# Patient Record
Sex: FEMALE | Race: WHITE | NOT HISPANIC OR LATINO | Employment: FULL TIME | URBAN - METROPOLITAN AREA
[De-identification: names, ages, dates, MRNs, and addresses within clinical notes are randomized per-mention and may not be internally consistent; named-entity substitution may affect disease eponyms.]

---

## 2018-12-17 ENCOUNTER — TELEPHONE (OUTPATIENT)
Dept: OBSTETRICS AND GYNECOLOGY | Facility: CLINIC | Age: 38
End: 2018-12-17

## 2018-12-17 DIAGNOSIS — Z12.39 SCREENING FOR BREAST CANCER: Primary | ICD-10-CM

## 2018-12-17 NOTE — TELEPHONE ENCOUNTER
She has made an annual appt she wants to have her mammogram before she is seen due to her mother having cancer, can you please place the order.

## 2019-01-09 ENCOUNTER — HOSPITAL ENCOUNTER (OUTPATIENT)
Dept: MAMMOGRAPHY | Facility: HOSPITAL | Age: 39
Discharge: HOME | End: 2019-01-09
Attending: OBSTETRICS & GYNECOLOGY | Admitting: OBSTETRICS & GYNECOLOGY

## 2019-01-09 DIAGNOSIS — Z12.39 SCREENING FOR BREAST CANCER: ICD-10-CM

## 2019-01-09 PROCEDURE — 77067 SCR MAMMO BI INCL CAD: CPT

## 2019-01-09 PROCEDURE — 77063 BREAST TOMOSYNTHESIS BI: CPT

## 2019-02-27 ENCOUNTER — HOSPITAL ENCOUNTER (OUTPATIENT)
Dept: GENERAL RADIOLOGY | Facility: HOSPITAL | Age: 39
Discharge: HOME | End: 2019-02-27
Admitting: NURSE PRACTITIONER

## 2019-02-27 ENCOUNTER — TRANSCRIBE ORDERS (OUTPATIENT)
Dept: ADMINISTRATIVE | Facility: HOSPITAL | Age: 39
End: 2019-02-27

## 2019-02-27 DIAGNOSIS — Z87.09 PERSONAL HISTORY OF DISEASE OF RESPIRATORY SYSTEM: Primary | ICD-10-CM

## 2019-02-27 DIAGNOSIS — Z87.09 PERSONAL HISTORY OF DISEASE OF RESPIRATORY SYSTEM: ICD-10-CM

## 2019-02-27 PROCEDURE — 71046 X-RAY EXAM CHEST 2 VIEWS: CPT

## 2019-03-04 ENCOUNTER — TRANSCRIBE ORDERS (OUTPATIENT)
Dept: ADMINISTRATIVE | Facility: HOSPITAL | Age: 39
End: 2019-03-04

## 2019-03-04 DIAGNOSIS — E04.1 THYROID NODULE: Primary | ICD-10-CM

## 2019-03-14 ENCOUNTER — APPOINTMENT (OUTPATIENT)
Dept: ULTRASOUND IMAGING | Facility: HOSPITAL | Age: 39
End: 2019-03-14

## 2019-03-19 ENCOUNTER — HOSPITAL ENCOUNTER (OUTPATIENT)
Dept: ULTRASOUND IMAGING | Facility: HOSPITAL | Age: 39
Discharge: HOME | End: 2019-03-19
Admitting: NURSE PRACTITIONER

## 2019-03-19 DIAGNOSIS — E04.1 THYROID NODULE: ICD-10-CM

## 2019-03-19 PROCEDURE — 76536 US EXAM OF HEAD AND NECK: CPT

## 2019-10-02 ENCOUNTER — OFFICE VISIT (OUTPATIENT)
Dept: GASTROENTEROLOGY | Facility: CLINIC | Age: 39
End: 2019-10-02

## 2019-10-02 VITALS
WEIGHT: 178.8 LBS | DIASTOLIC BLOOD PRESSURE: 76 MMHG | SYSTOLIC BLOOD PRESSURE: 122 MMHG | HEIGHT: 62 IN | BODY MASS INDEX: 32.9 KG/M2

## 2019-10-02 DIAGNOSIS — K21.9 GASTROESOPHAGEAL REFLUX DISEASE, ESOPHAGITIS PRESENCE NOT SPECIFIED: Primary | ICD-10-CM

## 2019-10-02 DIAGNOSIS — R10.13 EPIGASTRIC PAIN: ICD-10-CM

## 2019-10-02 PROCEDURE — 99203 OFFICE O/P NEW LOW 30 MIN: CPT | Performed by: INTERNAL MEDICINE

## 2019-10-02 RX ORDER — LISINOPRIL 20 MG/1
20 TABLET ORAL DAILY
Refills: 0 | COMMUNITY
Start: 2019-09-18

## 2019-10-02 RX ORDER — LEVOTHYROXINE SODIUM 175 UG/1
TABLET ORAL
Refills: 0 | COMMUNITY
Start: 2019-09-18 | End: 2020-09-07

## 2019-10-02 RX ORDER — OMEGA-3S/DHA/EPA/FISH OIL/D3 300MG-1000
1 CAPSULE ORAL DAILY
Refills: 0 | COMMUNITY
Start: 2019-09-18 | End: 2020-09-07

## 2019-10-02 RX ORDER — ERGOCALCIFEROL 1.25 MG/1
50000 CAPSULE ORAL WEEKLY
Refills: 0 | COMMUNITY
Start: 2019-09-18

## 2019-10-02 RX ORDER — PANTOPRAZOLE SODIUM 40 MG/1
40 TABLET, DELAYED RELEASE ORAL DAILY
Qty: 30 TABLET | Refills: 5 | Status: SHIPPED | OUTPATIENT
Start: 2019-10-02 | End: 2020-03-05

## 2019-10-02 NOTE — PROGRESS NOTES
PATIENT INFORMATION  Erica Huynh       - 1980    CHIEF COMPLAINT  Chief Complaint   Patient presents with   • Abdominal Pain   • Heartburn       HISTORY OF PRESENT ILLNESS  HPI  40 yo with history GERD with 6 months of epigastric pain, intermittent, dull/sharp, usually occurs within minutes of eating, radiates at times to the back. Severity is moderate.  No melena or hematochezia. She has retrosternal burning/heartburn, worse in the morning and at night. She was on ppi many years ago but stopped after one month.  No previous EGD.  Occasional dysphagia.  Diet:  One cup coffee daily  No soda  No tea  No etoh  Dinner at 7 pm  Bed at midnight  No snack after dinner.  Weight has been up 25 pounds in 4 years.        REVIEW OF SYSTEMS  Review of Systems   Constitutional: Positive for fatigue.   HENT: Positive for ear pain and hearing loss.    Gastrointestinal: Positive for abdominal pain and nausea.        Reflux   Allergic/Immunologic: Positive for environmental allergies.   All other systems reviewed and are negative.        ACTIVE PROBLEMS  There are no active problems to display for this patient.        PAST MEDICAL HISTORY  Past Medical History:   Diagnosis Date   • Hyperlipidemia    • Hypertension          SURGICAL HISTORY  Past Surgical History:   Procedure Laterality Date   • HYSTERECTOMY           FAMILY HISTORY  Family History   Problem Relation Age of Onset   • Breast cancer Neg Hx    • Colon cancer Neg Hx    • Colon polyps Neg Hx          SOCIAL HISTORY  Social History     Occupational History   • Not on file   Tobacco Use   • Smoking status: Current Every Day Smoker   • Smokeless tobacco: Never Used   Substance and Sexual Activity   • Alcohol use: No     Frequency: Never   • Drug use: Not on file   • Sexual activity: Not on file       Debilities/Disabilities Identified: None    Emotional Behavior: Appropriate    CURRENT MEDICATIONS    Current Outpatient Medications:   •  IBUPROFEN PO, Take  by  "mouth., Disp: , Rfl:   •  levothyroxine (SYNTHROID, LEVOTHROID) 175 MCG tablet, TAKE ONE TABLET BY MOUTH EVERY MORNING ON EMPTY STOMACH, Disp: , Rfl: 0  •  lisinopril (PRINIVIL,ZESTRIL) 20 MG tablet, Take 20 mg by mouth Daily., Disp: , Rfl: 0  •  Omega-3 Fatty Acids (FISH OIL BURP-LESS) 1200 MG capsule, Take 1 capsule by mouth Daily., Disp: , Rfl: 0  •  pantoprazole (PROTONIX) 40 MG EC tablet, Take 1 tablet by mouth Daily., Disp: 30 tablet, Rfl: 5  •  sertraline (ZOLOFT) 50 MG tablet, Take 50 mg by mouth Daily., Disp: , Rfl: 0  •  vitamin D (ERGOCALCIFEROL) 95265 units capsule capsule, Take 50,000 Units by mouth 1 (One) Time Per Week., Disp: , Rfl: 0    ALLERGIES  Patient has no known allergies.    VITALS  Vitals:    10/02/19 1333   BP: 122/76   Weight: 81.1 kg (178 lb 12.8 oz)   Height: 157.5 cm (62\")       LAST RESULTS   Hospital Outpatient Visit on 06/02/2015   Component Date Value Ref Range Status   • WBC 05/26/2015 16.78* 4.80 - 10.80 K/Cumm Final   • RBC 05/26/2015 4.37  4.20 - 5.40 Million Final   • Hemoglobin 05/26/2015 12.1  12.0 - 16.0 g/dL Final   • Hematocrit 05/26/2015 36.6* 37.0 - 47.0 % Final   • MCV 05/26/2015 83.8  81.0 - 99.0 fL Final   • MCH 05/26/2015 27.7  27.0 - 31.0 pg Final   • MCHC 05/26/2015 33.1  31.0 - 37.0 g/dL Final   • RDW-CV 05/26/2015 16.3* 11.5 - 14.5 % Final   • Platelets 05/26/2015 272  140 - 500 K/Cumm Final   • MPV 05/26/2015 10.1  7.4 - 10.4 fL Final   • Neutrophil Rel % 05/26/2015 84* 45 - 70 % Final   • Lymphocyte Rel % 05/26/2015 9* 20 - 45 % Final   • Monocyte Rel % 05/26/2015 5  3 - 8 % Final   • Eosinophil Rel % 05/26/2015 1  0 - 4 % Final   • Basophil Rel % 05/26/2015 0  0 - 2 % Final   • Neutrophils Absolute 05/26/2015 14.18* 1.50 - 8.30 K/Cumm Final   • Lymphocytes Absolute 05/26/2015 1.48  0.60 - 4.80 K/Cumm Final   • Monocytes Absolute 05/26/2015 0.85  0.00 - 1.00 K/Cumm Final   • Eosinophils Absolute 05/26/2015 0.21  0.10 - 0.30 K/Cumm Final   • Basophils Absolute " 05/26/2015 0.06  0.00 - 0.20 K/Cumm Final   • ABORh 05/26/2015 O Rh Positive   Final   • Antibody Screen 05/26/2015 Negative   Final   • HCG Qualitative 06/02/2015 Negative   Final   • WBC 06/02/2015 9.17  4.80 - 10.80 K/Cumm Final   • RBC 06/02/2015 4.69  4.20 - 5.40 Million Final   • Hemoglobin 06/02/2015 12.9  12.0 - 16.0 g/dL Final   • Hematocrit 06/02/2015 39.0  37.0 - 47.0 % Final   • MCV 06/02/2015 83.2  81.0 - 99.0 fL Final   • MCH 06/02/2015 27.5  27.0 - 31.0 pg Final   • MCHC 06/02/2015 33.1  31.0 - 37.0 g/dL Final   • RDW-CV 06/02/2015 15.9* 11.5 - 14.5 % Final   • Platelets 06/02/2015 254  140 - 500 K/Cumm Final   • MPV 06/02/2015 9.5  7.4 - 10.4 fL Final   • Neutrophil Rel % 06/02/2015 69  45 - 70 % Final   • Lymphocyte Rel % 06/02/2015 17* 20 - 45 % Final   • Monocyte Rel % 06/02/2015 9* 3 - 8 % Final   • Eosinophil Rel % 06/02/2015 4  0 - 4 % Final   • Basophil Rel % 06/02/2015 1  0 - 2 % Final   • Neutrophils Absolute 06/02/2015 6.33  1.50 - 8.30 K/Cumm Final   • Lymphocytes Absolute 06/02/2015 1.58  0.60 - 4.80 K/Cumm Final   • Monocytes Absolute 06/02/2015 0.79  0.00 - 1.00 K/Cumm Final   • Eosinophils Absolute 06/02/2015 0.36* 0.10 - 0.30 K/Cumm Final   • Basophils Absolute 06/02/2015 0.11  0.00 - 0.20 K/Cumm Final   • WBC 06/03/2015 18.61* 4.80 - 10.80 K/Cumm Final   • RBC 06/03/2015 3.91* 4.20 - 5.40 Million Final   • Hemoglobin 06/03/2015 10.5* 12.0 - 16.0 g/dL Final   • Hematocrit 06/03/2015 33.6* 37.0 - 47.0 % Final   • MCV 06/03/2015 85.9  81.0 - 99.0 fL Final   • MCH 06/03/2015 26.9* 27.0 - 31.0 pg Final   • MCHC 06/03/2015 31.3  31.0 - 37.0 g/dL Final   • RDW-CV 06/03/2015 16.2* 11.5 - 14.5 % Final   • Platelets 06/03/2015 227  140 - 500 K/Cumm Final   • MPV 06/03/2015 10.1  7.4 - 10.4 fL Final   • Neutrophil Rel % 06/03/2015 85* 45 - 70 % Final   • Lymphocyte Rel % 06/03/2015 7* 20 - 45 % Final   • Monocyte Rel % 06/03/2015 8  3 - 8 % Final   • Eosinophil Rel % 06/03/2015 0  0 - 4 % Final    • Basophil Rel % 06/03/2015 0  0 - 2 % Final   • Neutrophils Absolute 06/03/2015 15.83* 1.50 - 8.30 K/Cumm Final   • Lymphocytes Absolute 06/03/2015 1.22  0.60 - 4.80 K/Cumm Final   • Monocytes Absolute 06/03/2015 1.53* 0.00 - 1.00 K/Cumm Final   • Eosinophils Absolute 06/03/2015 0.01* 0.10 - 0.30 K/Cumm Final   • Basophils Absolute 06/03/2015 0.02  0.00 - 0.20 K/Cumm Final   • Pathology Report Tracking 06/02/2015 See Original Surgical Pathology Report from [facility]^[address]^[place] [address]   Final     No results found.    PHYSICAL EXAM  Physical Exam   Constitutional: She is oriented to person, place, and time. She appears well-developed and well-nourished. No distress.   HENT:   Head: Normocephalic and atraumatic.   Mouth/Throat: Oropharynx is clear and moist.   Eyes: EOM are normal. Pupils are equal, round, and reactive to light.   Neck: Normal range of motion. No tracheal deviation present.   Cardiovascular: Normal rate, regular rhythm, normal heart sounds and intact distal pulses. Exam reveals no gallop and no friction rub.   No murmur heard.  Pulmonary/Chest: Effort normal and breath sounds normal. No stridor. No respiratory distress. She has no wheezes. She has no rales. She exhibits no tenderness.   Abdominal: Soft. Bowel sounds are normal. She exhibits no distension. There is no tenderness. There is no rebound and no guarding.   Musculoskeletal: She exhibits no edema.   Lymphadenopathy:     She has no cervical adenopathy.   Neurological: She is alert and oriented to person, place, and time.   Skin: Skin is warm. She is not diaphoretic.   Psychiatric: She has a normal mood and affect. Her behavior is normal. Judgment and thought content normal.   Nursing note and vitals reviewed.      ASSESSMENT  Diagnoses and all orders for this visit:    Gastroesophageal reflux disease, esophagitis presence not specified  -     Hepatic  Function Panel  -     Lipase  -     Amylase    Epigastric pain  -     Hepatic Function Panel  -     Lipase  -     Amylase    Other orders  -     IBUPROFEN PO; Take  by mouth.  -     vitamin D (ERGOCALCIFEROL) 25935 units capsule capsule; Take 50,000 Units by mouth 1 (One) Time Per Week.  -     levothyroxine (SYNTHROID, LEVOTHROID) 175 MCG tablet; TAKE ONE TABLET BY MOUTH EVERY MORNING ON EMPTY STOMACH  -     lisinopril (PRINIVIL,ZESTRIL) 20 MG tablet; Take 20 mg by mouth Daily.  -     Omega-3 Fatty Acids (FISH OIL BURP-LESS) 1200 MG capsule; Take 1 capsule by mouth Daily.  -     sertraline (ZOLOFT) 50 MG tablet; Take 50 mg by mouth Daily.  -     pantoprazole (PROTONIX) 40 MG EC tablet; Take 1 tablet by mouth Daily.          PLAN  No Follow-up on file.        Antireflux measures and dietary modifications reviewed. Low acid diet reviewed. Keep head of bed elevated. Stop eating/drinking at least 3 hours prior to bedtime. Eliminate caffeine and carbonated beverages.  Weight loss encouraged if BMI over 25.    Indications, risks and procedure were discussed with the patient, including but not limited to, bleeding, infection, possibility of perforation and possible polypectomy. All of the patient's questions were answered, and signed informed consent was obtained and placed on the chart.

## 2019-10-03 PROBLEM — R10.13 EPIGASTRIC PAIN: Status: ACTIVE | Noted: 2019-10-03

## 2019-10-03 PROBLEM — K21.9 GASTROESOPHAGEAL REFLUX DISEASE: Status: ACTIVE | Noted: 2019-10-03

## 2019-10-10 ENCOUNTER — ANESTHESIA EVENT (OUTPATIENT)
Dept: PERIOP | Facility: HOSPITAL | Age: 39
End: 2019-10-10

## 2019-10-11 ENCOUNTER — HOSPITAL ENCOUNTER (OUTPATIENT)
Facility: HOSPITAL | Age: 39
Setting detail: HOSPITAL OUTPATIENT SURGERY
Discharge: HOME | End: 2019-10-11
Attending: INTERNAL MEDICINE | Admitting: INTERNAL MEDICINE

## 2019-10-11 ENCOUNTER — ANESTHESIA (OUTPATIENT)
Dept: PERIOP | Facility: HOSPITAL | Age: 39
End: 2019-10-11

## 2019-10-11 VITALS
TEMPERATURE: 97.2 F | BODY MASS INDEX: 33.16 KG/M2 | SYSTOLIC BLOOD PRESSURE: 109 MMHG | OXYGEN SATURATION: 99 % | DIASTOLIC BLOOD PRESSURE: 83 MMHG | HEIGHT: 62 IN | HEART RATE: 82 BPM | WEIGHT: 180.2 LBS | RESPIRATION RATE: 16 BRPM

## 2019-10-11 DIAGNOSIS — R10.13 EPIGASTRIC PAIN: ICD-10-CM

## 2019-10-11 DIAGNOSIS — K21.9 GASTROESOPHAGEAL REFLUX DISEASE, ESOPHAGITIS PRESENCE NOT SPECIFIED: ICD-10-CM

## 2019-10-11 PROCEDURE — 25010000002 PROPOFOL 10 MG/ML EMULSION: Performed by: NURSE ANESTHETIST, CERTIFIED REGISTERED

## 2019-10-11 PROCEDURE — 88313 SPECIAL STAINS GROUP 2: CPT | Performed by: INTERNAL MEDICINE

## 2019-10-11 PROCEDURE — 88305 TISSUE EXAM BY PATHOLOGIST: CPT | Performed by: INTERNAL MEDICINE

## 2019-10-11 PROCEDURE — 43239 EGD BIOPSY SINGLE/MULTIPLE: CPT | Performed by: INTERNAL MEDICINE

## 2019-10-11 RX ORDER — ONDANSETRON 2 MG/ML
4 INJECTION INTRAMUSCULAR; INTRAVENOUS ONCE AS NEEDED
Status: DISCONTINUED | OUTPATIENT
Start: 2019-10-11 | End: 2019-10-11 | Stop reason: HOSPADM

## 2019-10-11 RX ORDER — LIDOCAINE HYDROCHLORIDE 10 MG/ML
0.5 INJECTION, SOLUTION EPIDURAL; INFILTRATION; INTRACAUDAL; PERINEURAL ONCE AS NEEDED
Status: COMPLETED | OUTPATIENT
Start: 2019-10-11 | End: 2019-10-11

## 2019-10-11 RX ORDER — SODIUM CHLORIDE 0.9 % (FLUSH) 0.9 %
1-10 SYRINGE (ML) INJECTION AS NEEDED
Status: DISCONTINUED | OUTPATIENT
Start: 2019-10-11 | End: 2019-10-11 | Stop reason: HOSPADM

## 2019-10-11 RX ORDER — SODIUM CHLORIDE, SODIUM LACTATE, POTASSIUM CHLORIDE, CALCIUM CHLORIDE 600; 310; 30; 20 MG/100ML; MG/100ML; MG/100ML; MG/100ML
9 INJECTION, SOLUTION INTRAVENOUS CONTINUOUS
Status: DISCONTINUED | OUTPATIENT
Start: 2019-10-11 | End: 2019-10-11 | Stop reason: HOSPADM

## 2019-10-11 RX ORDER — LIDOCAINE HYDROCHLORIDE 20 MG/ML
INJECTION, SOLUTION INFILTRATION; PERINEURAL AS NEEDED
Status: DISCONTINUED | OUTPATIENT
Start: 2019-10-11 | End: 2019-10-11 | Stop reason: SURG

## 2019-10-11 RX ORDER — SODIUM CHLORIDE, SODIUM LACTATE, POTASSIUM CHLORIDE, CALCIUM CHLORIDE 600; 310; 30; 20 MG/100ML; MG/100ML; MG/100ML; MG/100ML
100 INJECTION, SOLUTION INTRAVENOUS CONTINUOUS
Status: DISCONTINUED | OUTPATIENT
Start: 2019-10-11 | End: 2019-10-11 | Stop reason: HOSPADM

## 2019-10-11 RX ORDER — SODIUM CHLORIDE 9 MG/ML
40 INJECTION, SOLUTION INTRAVENOUS AS NEEDED
Status: DISCONTINUED | OUTPATIENT
Start: 2019-10-11 | End: 2019-10-11 | Stop reason: HOSPADM

## 2019-10-11 RX ORDER — PROPOFOL 10 MG/ML
VIAL (ML) INTRAVENOUS AS NEEDED
Status: DISCONTINUED | OUTPATIENT
Start: 2019-10-11 | End: 2019-10-11 | Stop reason: SURG

## 2019-10-11 RX ORDER — SODIUM CHLORIDE 0.9 % (FLUSH) 0.9 %
3 SYRINGE (ML) INJECTION EVERY 12 HOURS SCHEDULED
Status: DISCONTINUED | OUTPATIENT
Start: 2019-10-11 | End: 2019-10-11 | Stop reason: HOSPADM

## 2019-10-11 RX ADMIN — PROPOFOL 100 MG: 10 INJECTION, EMULSION INTRAVENOUS at 09:09

## 2019-10-11 RX ADMIN — SODIUM CHLORIDE, POTASSIUM CHLORIDE, SODIUM LACTATE AND CALCIUM CHLORIDE 9 ML/HR: 600; 310; 30; 20 INJECTION, SOLUTION INTRAVENOUS at 08:40

## 2019-10-11 RX ADMIN — LIDOCAINE HYDROCHLORIDE 100 MG: 20 INJECTION, SOLUTION INFILTRATION; PERINEURAL at 09:08

## 2019-10-11 RX ADMIN — LIDOCAINE HYDROCHLORIDE 0.1 ML: 10 INJECTION, SOLUTION EPIDURAL; INFILTRATION; INTRACAUDAL; PERINEURAL at 08:39

## 2019-10-11 RX ADMIN — PROPOFOL 50 MG: 10 INJECTION, EMULSION INTRAVENOUS at 09:17

## 2019-10-11 RX ADMIN — PROPOFOL 50 MG: 10 INJECTION, EMULSION INTRAVENOUS at 09:13

## 2019-10-11 RX ADMIN — PROPOFOL 50 MG: 10 INJECTION, EMULSION INTRAVENOUS at 09:21

## 2019-10-11 NOTE — ANESTHESIA PREPROCEDURE EVALUATION
Anesthesia Evaluation     Patient summary reviewed and Nursing notes reviewed   no history of anesthetic complications:  NPO Solid Status: > 8 hours  NPO Liquid Status: > 8 hours           Airway   Mallampati: I  TM distance: >3 FB  Neck ROM: full  No difficulty expected  Dental - normal exam     Pulmonary - normal exam    breath sounds clear to auscultation  (+) a smoker Current Smoked day of surgery, sleep apnea (Snores),   Cardiovascular - normal exam  Exercise tolerance: good (4-7 METS)    Rhythm: regular  Rate: normal    (+) hypertension, hyperlipidemia,       Neuro/Psych  (+) psychiatric history Anxiety,     GI/Hepatic/Renal/Endo    (+) obesity,  GERD,  hypothyroidism,     Musculoskeletal (-) negative ROS    Abdominal  - normal exam   Substance History - negative use     OB/GYN negative ob/gyn ROS         Other - negative ROS                       Anesthesia Plan    ASA 2     MAC     intravenous induction   Anesthetic plan, all risks, benefits, and alternatives have been provided, discussed and informed consent has been obtained with: patient.  Use of blood products discussed with patient  Consented to blood products.

## 2019-10-11 NOTE — H&P
- 1980   CHIEF COMPLAINT       Chief Complaint   Patient presents with   • Abdominal Pain   • Heartburn     HISTORY OF PRESENT ILLNESS   HPI   40 yo with history GERD with 6 months of epigastric pain, intermittent, dull/sharp, usually occurs within minutes of eating, radiates at times to the back. Severity is moderate.   No melena or hematochezia. She has retrosternal burning/heartburn, worse in the morning and at night. She was on ppi many years ago but stopped after one month.   No previous EGD.   Occasional dysphagia.   Diet:   One cup coffee daily   No soda   No tea   No etoh   Dinner at 7 pm   Bed at midnight   No snack after dinner.   Weight has been up 25 pounds in 4 years.   REVIEW OF SYSTEMS   Review of Systems   Constitutional: Positive for fatigue.   HENT: Positive for ear pain and hearing loss.   Gastrointestinal: Positive for abdominal pain and nausea.   Reflux   Allergic/Immunologic: Positive for environmental allergies.   All other systems reviewed and are negative.     ACTIVE PROBLEMS   There are no active problems to display for this patient.     PAST MEDICAL HISTORY   Medical History        Past Medical History:   Diagnosis Date   • Hyperlipidemia    • Hypertension      SURGICAL HISTORY   Surgical History         Past Surgical History:   Procedure Laterality Date   • HYSTERECTOMY       FAMILY HISTORY         Family History   Problem Relation Age of Onset   • Breast cancer Neg Hx    • Colon cancer Neg Hx    • Colon polyps Neg Hx      SOCIAL HISTORY   Social History           Occupational History   • Not on file   Tobacco Use   • Smoking status: Current Every Day Smoker   • Smokeless tobacco: Never Used   Substance and Sexual Activity   • Alcohol use: No     Frequency: Never   • Drug use: Not on file   • Sexual activity: Not on file     Debilities/Disabilities Identified: None   Emotional Behavior: Appropriate   CURRENT MEDICATIONS     Current Outpatient Medications:   • IBUPROFEN PO, Take  "by mouth., Disp: , Rfl:   • levothyroxine (SYNTHROID, LEVOTHROID) 175 MCG tablet, TAKE ONE TABLET BY MOUTH EVERY MORNING ON EMPTY STOMACH, Disp: , Rfl: 0   • lisinopril (PRINIVIL,ZESTRIL) 20 MG tablet, Take 20 mg by mouth Daily., Disp: , Rfl: 0   • Omega-3 Fatty Acids (FISH OIL BURP-LESS) 1200 MG capsule, Take 1 capsule by mouth Daily., Disp: , Rfl: 0   • pantoprazole (PROTONIX) 40 MG EC tablet, Take 1 tablet by mouth Daily., Disp: 30 tablet, Rfl: 5   • sertraline (ZOLOFT) 50 MG tablet, Take 50 mg by mouth Daily., Disp: , Rfl: 0   • vitamin D (ERGOCALCIFEROL) 64860 units capsule capsule, Take 50,000 Units by mouth 1 (One) Time Per Week., Disp: , Rfl: 0   ALLERGIES   Patient has no known allergies.   VITALS   /80   Pulse 96   Temp 98.4 °F (36.9 °C) (Oral)   Resp 15   Ht 157.5 cm (62.01\")   Wt 81.7 kg (180 lb 3.2 oz)   SpO2 97%   BMI 32.95 kg/m²                                     LAST RESULTS           Hospital Outpatient Visit on 06/02/2015   Component Date Value Ref Range Status   • WBC 05/26/2015 16.78* 4.80 - 10.80 K/Cumm Final   • RBC 05/26/2015 4.37  4.20 - 5.40 Million Final   • Hemoglobin 05/26/2015 12.1  12.0 - 16.0 g/dL Final   • Hematocrit 05/26/2015 36.6* 37.0 - 47.0 % Final   • MCV 05/26/2015 83.8  81.0 - 99.0 fL Final   • MCH 05/26/2015 27.7  27.0 - 31.0 pg Final   • MCHC 05/26/2015 33.1  31.0 - 37.0 g/dL Final   • RDW-CV 05/26/2015 16.3* 11.5 - 14.5 % Final   • Platelets 05/26/2015 272  140 - 500 K/Cumm Final   • MPV 05/26/2015 10.1  7.4 - 10.4 fL Final   • Neutrophil Rel % 05/26/2015 84* 45 - 70 % Final   • Lymphocyte Rel % 05/26/2015 9* 20 - 45 % Final   • Monocyte Rel % 05/26/2015 5  3 - 8 % Final   • Eosinophil Rel % 05/26/2015 1  0 - 4 % Final   • Basophil Rel % 05/26/2015 0  0 - 2 % Final   • Neutrophils Absolute 05/26/2015 14.18* 1.50 - 8.30 K/Cumm Final   • Lymphocytes Absolute 05/26/2015 1.48  0.60 - 4.80 K/Cumm Final   • Monocytes Absolute 05/26/2015 0.85  0.00 - 1.00 K/Cumm " Final   • Eosinophils Absolute 05/26/2015 0.21  0.10 - 0.30 K/Cumm Final   • Basophils Absolute 05/26/2015 0.06  0.00 - 0.20 K/Cumm Final   • ABORh 05/26/2015 O Rh Positive   Final   • Antibody Screen 05/26/2015 Negative   Final   • HCG Qualitative 06/02/2015 Negative   Final   • WBC 06/02/2015 9.17  4.80 - 10.80 K/Cumm Final   • RBC 06/02/2015 4.69  4.20 - 5.40 Million Final   • Hemoglobin 06/02/2015 12.9  12.0 - 16.0 g/dL Final   • Hematocrit 06/02/2015 39.0  37.0 - 47.0 % Final   • MCV 06/02/2015 83.2  81.0 - 99.0 fL Final   • MCH 06/02/2015 27.5  27.0 - 31.0 pg Final   • MCHC 06/02/2015 33.1  31.0 - 37.0 g/dL Final   • RDW-CV 06/02/2015 15.9* 11.5 - 14.5 % Final   • Platelets 06/02/2015 254  140 - 500 K/Cumm Final   • MPV 06/02/2015 9.5  7.4 - 10.4 fL Final   • Neutrophil Rel % 06/02/2015 69  45 - 70 % Final   • Lymphocyte Rel % 06/02/2015 17* 20 - 45 % Final   • Monocyte Rel % 06/02/2015 9* 3 - 8 % Final   • Eosinophil Rel % 06/02/2015 4  0 - 4 % Final   • Basophil Rel % 06/02/2015 1  0 - 2 % Final   • Neutrophils Absolute 06/02/2015 6.33  1.50 - 8.30 K/Cumm Final   • Lymphocytes Absolute 06/02/2015 1.58  0.60 - 4.80 K/Cumm Final   • Monocytes Absolute 06/02/2015 0.79  0.00 - 1.00 K/Cumm Final   • Eosinophils Absolute 06/02/2015 0.36* 0.10 - 0.30 K/Cumm Final   • Basophils Absolute 06/02/2015 0.11  0.00 - 0.20 K/Cumm Final   • WBC 06/03/2015 18.61* 4.80 - 10.80 K/Cumm Final   • RBC 06/03/2015 3.91* 4.20 - 5.40 Million Final   • Hemoglobin 06/03/2015 10.5* 12.0 - 16.0 g/dL Final   • Hematocrit 06/03/2015 33.6* 37.0 - 47.0 % Final   • MCV 06/03/2015 85.9  81.0 - 99.0 fL Final   • MCH 06/03/2015 26.9* 27.0 - 31.0 pg Final   • MCHC 06/03/2015 31.3  31.0 - 37.0 g/dL Final   • RDW-CV 06/03/2015 16.2* 11.5 - 14.5 % Final   • Platelets 06/03/2015 227  140 - 500 K/Cumm Final   • MPV 06/03/2015 10.1  7.4 - 10.4 fL Final   • Neutrophil Rel % 06/03/2015 85* 45 - 70 % Final   • Lymphocyte Rel % 06/03/2015 7* 20 - 45 % Final    • Monocyte Rel % 06/03/2015 8  3 - 8 % Final   • Eosinophil Rel % 06/03/2015 0  0 - 4 % Final   • Basophil Rel % 06/03/2015 0  0 - 2 % Final   • Neutrophils Absolute 06/03/2015 15.83* 1.50 - 8.30 K/Cumm Final   • Lymphocytes Absolute 06/03/2015 1.22  0.60 - 4.80 K/Cumm Final   • Monocytes Absolute 06/03/2015 1.53* 0.00 - 1.00 K/Cumm Final   • Eosinophils Absolute 06/03/2015 0.01* 0.10 - 0.30 K/Cumm Final   • Basophils Absolute 06/03/2015 0.02  0.00 - 0.20 K/Cumm Final   • Pathology Report Tracking 06/02/2015 See Original Surgical Pathology Report from [facility]^[address]^[place] [address]   Final     No results found.   PHYSICAL EXAM   Physical Exam   Constitutional: She is oriented to person, place, and time. She appears well-developed and well-nourished. No distress.   HENT:   Head: Normocephalic and atraumatic.   Mouth/Throat: Oropharynx is clear and moist.   Eyes: EOM are normal. Pupils are equal, round, and reactive to light.   Neck: Normal range of motion. No tracheal deviation present.   Cardiovascular: Normal rate, regular rhythm, normal heart sounds and intact distal pulses. Exam reveals no gallop and no friction rub.   No murmur heard.   Pulmonary/Chest: Effort normal and breath sounds normal. No stridor. No respiratory distress. She has no wheezes. She has no rales. She exhibits no tenderness.   Abdominal: Soft. Bowel sounds are normal. She exhibits no distension. There is no tenderness. There is no rebound and no guarding.   Musculoskeletal: She exhibits no edema.   Lymphadenopathy:   She has no cervical adenopathy.   Neurological: She is alert and oriented to person, place, and time.   Skin: Skin is warm. She is not diaphoretic.   Psychiatric: She has a normal mood and affect. Her behavior is normal. Judgment and thought content normal.   Nursing note and vitals reviewed.     ASSESSMENT   Diagnoses and all orders for this  visit:   Gastroesophageal reflux disease, esophagitis presence not specified   - Hepatic Function Panel   - Lipase   - Amylase   Epigastric pain   - Hepatic Function Panel   - Lipase   - Amylase   Other orders   - IBUPROFEN PO; Take by mouth.   - vitamin D (ERGOCALCIFEROL) 21520 units capsule capsule; Take 50,000 Units by mouth 1 (One) Time Per Week.   - levothyroxine (SYNTHROID, LEVOTHROID) 175 MCG tablet; TAKE ONE TABLET BY MOUTH EVERY MORNING ON EMPTY STOMACH   - lisinopril (PRINIVIL,ZESTRIL) 20 MG tablet; Take 20 mg by mouth Daily.   - Omega-3 Fatty Acids (FISH OIL BURP-LESS) 1200 MG capsule; Take 1 capsule by mouth Daily.   - sertraline (ZOLOFT) 50 MG tablet; Take 50 mg by mouth Daily.   - pantoprazole (PROTONIX) 40 MG EC tablet; Take 1 tablet by mouth Daily.     PLAN   No Follow-up on file.   Antireflux measures and dietary modifications reviewed. Low acid diet reviewed. Keep head of bed elevated. Stop eating/drinking at least 3 hours prior to bedtime. Eliminate caffeine and carbonated beverages. Weight loss encouraged if BMI over 25.   Indications, risks and procedure were discussed with the patient, including but not limited to, bleeding, infection, possibility of perforation and possible polypectomy. All of the patient's questions were answered, and signed informed consent was obtained and placed on the chart.

## 2019-10-11 NOTE — OP NOTE
Patient Name:  Erica Huynh  YOB: 1980    Date of Procedure:  10/11/2019    Procedure Performed: EGD  Indications: GERD, epigastric pain    Pre-op Diagnosis:   Gastroesophageal reflux disease, esophagitis presence not specified [K21.9]  Epigastric pain [R10.13]    Post-Op Diagnosis Codes:     * Gastroesophageal reflux disease, esophagitis presence not specified [K21.9]     * Epigastric pain [R10.13]         Staff:  Surgeon(s):  Saba Samuel MD         Consent: Procedure EGD indications, risks and procedure were discussed with the patient, including but not limited to, bleeding, infection, possibility of perforation and possible polypectomy. All of the patient's questions were answered, and signed informed consent was obtained and placed on the chart.      Sedation: Sedation was provided by anesthesia.      Estimated Blood Loss: minimal    Specimens:   ID Type Source Tests Collected by Time   A : Gastric Biopsy Tissue Gastric, Body TISSUE PATHOLOGY EXAM aSba Samuel MD 10/11/2019 0918   B : Distal Esophagus Biopsy Tissue Esophagus, Distal TISSUE PATHOLOGY EXAM Saba Samuel MD 10/11/2019 0918         Description of Procedure:   After excellent sedation a flexible endoscope was passed into the oropharynx into the distal esophagus.  There is evidence of grade a esophagitis the Z line was irregular biopsies were obtained using forceps.  She has a small sliding hiatal hernia that is noted.  The scope was easily traversed to the stomach although into the antrum.  Gastritis is noted here biopsies are obtained using forceps.  Scope was retroflexed here straightened and passed into the duodenal bulb all the way to the second portion with ease.  The entire examined small bowel mucosa overall appears normal.  The scope was then slowly withdrawn out the patient with no immediate complications and she tolerated the procedure well.       Findings:   Grade a esophagitis  Sliding hiatal hernia  Irregular  Z line  Gastritis  We will await biopsy results  She we will continue on her PPI therapy and see him back in the office in 4 weeks    Complications: None        Saba Samuel MD     Date: 10/11/2019  Time: 10:14 AM

## 2019-10-11 NOTE — ANESTHESIA POSTPROCEDURE EVALUATION
Patient: Erica Huynh    Procedure Summary     Date:  10/11/19 Room / Location:   [facility] /  [facility]    Anesthesia Start:  0907 Anesthesia Stop:  0924    Procedure:  ESOPHAGOGASTRODUODENOSCOPY with biopsies (N/A Esophagus) Diagnosis:       Gastroesophageal reflux disease, esophagitis presence not specified      Epigastric pain      (Gastroesophageal reflux disease, esophagitis presence not specified [K21.9])      (Epigastric pain [R10.13])    Surgeon:  Saba Samuel MD Provider:  Diana Smiley CRNA    Anesthesia Type:  MAC ASA Status:  2          Anesthesia Type: MAC  Last vitals  BP   118/71 (10/11/19 0935)   Temp   97.2 °F (36.2 °C) (10/11/19 0925)   Pulse   91 (10/11/19 0935)   Resp   15 (10/11/19 0935)     SpO2   96 % (10/11/19 0935)     Post Anesthesia Care and Evaluation    Patient location during evaluation: PHASE II  Patient participation: complete - patient participated  Level of consciousness: awake and alert  Pain score: 0  Pain management: adequate  Airway patency: patent  Anesthetic complications: No anesthetic complications  PONV Status: none  Cardiovascular status: acceptable  Respiratory status: acceptable  Hydration status: acceptable

## 2019-10-15 LAB
CYTO UR: NORMAL
LAB AP CASE REPORT: NORMAL
LAB AP SPECIAL STAINS: NORMAL
PATH REPORT.FINAL DX SPEC: NORMAL
PATH REPORT.GROSS SPEC: NORMAL

## 2019-10-15 NOTE — PROGRESS NOTES
Let her know her gastric biopsies are negative for H. pylori.  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  There is also evidence of reflux esophagitis.  Have her continue on her PPI therapy and see me back in the office in 3 to 4 weeks to discuss her Elmore's.  Put her also on for repeat EGD in 2 years

## 2020-03-05 RX ORDER — PANTOPRAZOLE SODIUM 40 MG/1
TABLET, DELAYED RELEASE ORAL
Qty: 90 TABLET | Refills: 3 | Status: SHIPPED | OUTPATIENT
Start: 2020-03-05 | End: 2020-12-12

## 2021-05-10 ENCOUNTER — TRANSCRIBE ORDERS (OUTPATIENT)
Dept: ADMINISTRATIVE | Facility: HOSPITAL | Age: 41
End: 2021-05-10

## 2021-05-10 DIAGNOSIS — N20.0 CALCULUS OF KIDNEY: Primary | ICD-10-CM

## 2021-05-14 ENCOUNTER — APPOINTMENT (OUTPATIENT)
Dept: ULTRASOUND IMAGING | Facility: HOSPITAL | Age: 41
End: 2021-05-14

## 2021-05-17 ENCOUNTER — APPOINTMENT (OUTPATIENT)
Dept: ULTRASOUND IMAGING | Facility: HOSPITAL | Age: 41
End: 2021-05-17

## 2021-05-20 ENCOUNTER — HOSPITAL ENCOUNTER (OUTPATIENT)
Dept: ULTRASOUND IMAGING | Facility: HOSPITAL | Age: 41
Discharge: HOME | End: 2021-05-20
Admitting: NURSE PRACTITIONER

## 2021-05-20 DIAGNOSIS — N20.0 CALCULUS OF KIDNEY: ICD-10-CM

## 2021-05-20 PROCEDURE — 76775 US EXAM ABDO BACK WALL LIM: CPT

## 2021-09-03 ENCOUNTER — TRANSCRIBE ORDERS (OUTPATIENT)
Dept: ADMINISTRATIVE | Facility: HOSPITAL | Age: 41
End: 2021-09-03

## 2021-09-03 DIAGNOSIS — Z12.31 VISIT FOR SCREENING MAMMOGRAM: Primary | ICD-10-CM

## 2021-09-23 ENCOUNTER — HOSPITAL ENCOUNTER (OUTPATIENT)
Dept: MAMMOGRAPHY | Facility: HOSPITAL | Age: 41
End: 2021-09-23

## 2022-01-26 ENCOUNTER — TRANSCRIBE ORDERS (OUTPATIENT)
Dept: ADMINISTRATIVE | Facility: HOSPITAL | Age: 42
End: 2022-01-26

## 2022-01-26 DIAGNOSIS — R10.84 ABDOMINAL PAIN, GENERALIZED: Primary | ICD-10-CM

## 2022-01-26 DIAGNOSIS — N63.0 LUMP OR MASS IN BREAST: ICD-10-CM

## 2022-02-16 ENCOUNTER — HOSPITAL ENCOUNTER (OUTPATIENT)
Dept: ULTRASOUND IMAGING | Facility: HOSPITAL | Age: 42
Discharge: HOME | End: 2022-02-16
Admitting: NURSE PRACTITIONER

## 2022-02-16 DIAGNOSIS — R10.84 ABDOMINAL PAIN, GENERALIZED: ICD-10-CM

## 2022-02-16 PROCEDURE — 76700 US EXAM ABDOM COMPLETE: CPT

## 2022-02-22 ENCOUNTER — APPOINTMENT (OUTPATIENT)
Dept: ULTRASOUND IMAGING | Facility: HOSPITAL | Age: 42
End: 2022-02-22

## 2022-02-22 ENCOUNTER — HOSPITAL ENCOUNTER (OUTPATIENT)
Dept: MAMMOGRAPHY | Facility: HOSPITAL | Age: 42
Discharge: HOME | End: 2022-02-22

## 2022-02-22 ENCOUNTER — HOSPITAL ENCOUNTER (OUTPATIENT)
Dept: ULTRASOUND IMAGING | Facility: HOSPITAL | Age: 42
Discharge: HOME | End: 2022-02-22

## 2022-02-22 ENCOUNTER — APPOINTMENT (OUTPATIENT)
Dept: MAMMOGRAPHY | Facility: HOSPITAL | Age: 42
End: 2022-02-22

## 2022-02-22 DIAGNOSIS — N63.0 LUMP OR MASS IN BREAST: ICD-10-CM

## 2022-02-22 PROCEDURE — 77066 DX MAMMO INCL CAD BI: CPT

## 2022-02-22 PROCEDURE — G0279 TOMOSYNTHESIS, MAMMO: HCPCS

## 2022-02-22 PROCEDURE — 76642 ULTRASOUND BREAST LIMITED: CPT

## 2024-11-13 ENCOUNTER — TRANSCRIBE ORDERS (OUTPATIENT)
Dept: ADMINISTRATIVE | Facility: HOSPITAL | Age: 44
End: 2024-11-13
Payer: COMMERCIAL

## 2024-11-13 DIAGNOSIS — Z12.31 SCREENING MAMMOGRAM, ENCOUNTER FOR: Primary | ICD-10-CM

## 2025-01-22 ENCOUNTER — CONSULT (OUTPATIENT)
Dept: ONCOLOGY | Facility: CLINIC | Age: 45
End: 2025-01-22
Payer: COMMERCIAL

## 2025-01-22 ENCOUNTER — PRIOR AUTHORIZATION (OUTPATIENT)
Dept: ONCOLOGY | Facility: CLINIC | Age: 45
End: 2025-01-22
Payer: COMMERCIAL

## 2025-01-22 ENCOUNTER — LAB (OUTPATIENT)
Dept: LAB | Facility: HOSPITAL | Age: 45
End: 2025-01-22
Payer: COMMERCIAL

## 2025-01-22 VITALS
TEMPERATURE: 98.2 F | WEIGHT: 140.8 LBS | SYSTOLIC BLOOD PRESSURE: 128 MMHG | BODY MASS INDEX: 25.91 KG/M2 | HEART RATE: 88 BPM | OXYGEN SATURATION: 98 % | DIASTOLIC BLOOD PRESSURE: 87 MMHG | RESPIRATION RATE: 16 BRPM | HEIGHT: 62 IN

## 2025-01-22 DIAGNOSIS — D72.829 LEUKOCYTOSIS, UNSPECIFIED TYPE: ICD-10-CM

## 2025-01-22 DIAGNOSIS — D72.829 LEUKOCYTOSIS, UNSPECIFIED TYPE: Primary | ICD-10-CM

## 2025-01-22 DIAGNOSIS — Z12.9 ENCOUNTER FOR CANCER SCREENING: ICD-10-CM

## 2025-01-22 DIAGNOSIS — D75.1 POLYCYTHEMIA: Primary | ICD-10-CM

## 2025-01-22 LAB
ALBUMIN SERPL-MCNC: 4.4 G/DL (ref 3.5–5.2)
ALBUMIN/GLOB SERPL: 1.7 G/DL
ALP SERPL-CCNC: 63 U/L (ref 39–117)
ALT SERPL W P-5'-P-CCNC: 12 U/L (ref 1–33)
ANION GAP SERPL CALCULATED.3IONS-SCNC: 10.1 MMOL/L (ref 5–15)
AST SERPL-CCNC: 13 U/L (ref 1–32)
BASOPHILS # BLD AUTO: 0.2 10*3/MM3 (ref 0–0.2)
BASOPHILS NFR BLD AUTO: 1.2 % (ref 0–1.5)
BILIRUB SERPL-MCNC: 0.5 MG/DL (ref 0–1.2)
BUN SERPL-MCNC: 21 MG/DL (ref 6–20)
BUN/CREAT SERPL: 26.6 (ref 7–25)
CALCIUM SPEC-SCNC: 9.8 MG/DL (ref 8.6–10.5)
CHLORIDE SERPL-SCNC: 100 MMOL/L (ref 98–107)
CO2 SERPL-SCNC: 23.9 MMOL/L (ref 22–29)
CREAT SERPL-MCNC: 0.79 MG/DL (ref 0.57–1)
CRP SERPL-MCNC: 0.56 MG/DL (ref 0–0.5)
DEPRECATED RDW RBC AUTO: 43.5 FL (ref 37–54)
EGFRCR SERPLBLD CKD-EPI 2021: 94.1 ML/MIN/1.73
EOSINOPHIL # BLD AUTO: 0.57 10*3/MM3 (ref 0–0.4)
EOSINOPHIL NFR BLD AUTO: 3.5 % (ref 0.3–6.2)
ERYTHROCYTE [DISTWIDTH] IN BLOOD BY AUTOMATED COUNT: 12.9 % (ref 12.3–15.4)
ERYTHROCYTE [SEDIMENTATION RATE] IN BLOOD: 5 MM/HR (ref 0–20)
GLOBULIN UR ELPH-MCNC: 2.6 GM/DL
GLUCOSE SERPL-MCNC: 89 MG/DL (ref 65–99)
HCT VFR BLD AUTO: 48 % (ref 34–46.6)
HGB BLD-MCNC: 16.7 G/DL (ref 12–15.9)
IMM GRANULOCYTES # BLD AUTO: 0.09 10*3/MM3 (ref 0–0.05)
IMM GRANULOCYTES NFR BLD AUTO: 0.5 % (ref 0–0.5)
LDH SERPL-CCNC: 183 U/L (ref 135–214)
LYMPHOCYTES # BLD AUTO: 3.08 10*3/MM3 (ref 0.7–3.1)
LYMPHOCYTES NFR BLD AUTO: 18.8 % (ref 19.6–45.3)
MCH RBC QN AUTO: 32.1 PG (ref 26.6–33)
MCHC RBC AUTO-ENTMCNC: 34.8 G/DL (ref 31.5–35.7)
MCV RBC AUTO: 92.1 FL (ref 79–97)
MONOCYTES # BLD AUTO: 1.17 10*3/MM3 (ref 0.1–0.9)
MONOCYTES NFR BLD AUTO: 7.1 % (ref 5–12)
NEUTROPHILS NFR BLD AUTO: 11.26 10*3/MM3 (ref 1.7–7)
NEUTROPHILS NFR BLD AUTO: 68.9 % (ref 42.7–76)
NRBC BLD AUTO-RTO: 0 /100 WBC (ref 0–0.2)
PLATELET # BLD AUTO: 289 10*3/MM3 (ref 140–450)
PMV BLD AUTO: 9.7 FL (ref 6–12)
POTASSIUM SERPL-SCNC: 4.8 MMOL/L (ref 3.5–5.2)
PROT SERPL-MCNC: 7 G/DL (ref 6–8.5)
RBC # BLD AUTO: 5.21 10*6/MM3 (ref 3.77–5.28)
SODIUM SERPL-SCNC: 134 MMOL/L (ref 136–145)
WBC NRBC COR # BLD AUTO: 16.37 10*3/MM3 (ref 3.4–10.8)

## 2025-01-22 PROCEDURE — 85025 COMPLETE CBC W/AUTO DIFF WBC: CPT | Performed by: INTERNAL MEDICINE

## 2025-01-22 PROCEDURE — 85652 RBC SED RATE AUTOMATED: CPT | Performed by: INTERNAL MEDICINE

## 2025-01-22 PROCEDURE — 82668 ASSAY OF ERYTHROPOIETIN: CPT | Performed by: INTERNAL MEDICINE

## 2025-01-22 PROCEDURE — 80053 COMPREHEN METABOLIC PANEL: CPT | Performed by: INTERNAL MEDICINE

## 2025-01-22 PROCEDURE — 86140 C-REACTIVE PROTEIN: CPT | Performed by: INTERNAL MEDICINE

## 2025-01-22 PROCEDURE — 36415 COLL VENOUS BLD VENIPUNCTURE: CPT | Performed by: INTERNAL MEDICINE

## 2025-01-22 PROCEDURE — 83615 LACTATE (LD) (LDH) ENZYME: CPT | Performed by: INTERNAL MEDICINE

## 2025-01-22 RX ORDER — SEMAGLUTIDE 1.34 MG/ML
2 INJECTION, SOLUTION SUBCUTANEOUS WEEKLY
COMMUNITY

## 2025-01-22 RX ORDER — CHLORAL HYDRATE 500 MG
1000 CAPSULE ORAL
COMMUNITY

## 2025-01-22 RX ORDER — LANOLIN ALCOHOL/MO/W.PET/CERES
1000 CREAM (GRAM) TOPICAL DAILY
COMMUNITY

## 2025-01-22 NOTE — PROGRESS NOTES
Subjective     REASON FOR CONSULTATION:  leukocytosis, polycythemia  Provide an opinion on any further workup or treatment                             REQUESTING PHYSICIAN:  Davin    RECORDS OBTAINED:  Records of the patients history including those obtained from the referring provider were reviewed and summarized in detail.    HISTORY OF PRESENT ILLNESS:  The patient is a 44 y.o. year old female who is here for an opinion about the above issue.    History of Present Illness   This is a pleasant 44-year-old woman referred from her primary care provider for evaluation of leukocytosis and polycythemia.  The patient's white cell count has intermittently been high for several years but polycythemia is a more recent finding.  She has been on Ozempic since April 2024 and lost 50 pounds of weight with combination of Ozempic and diet.  She otherwise feels well with no fevers chills night sweats lymphadenopathy dysuria cough shortness of breath or other constitutional type symptoms.  She denies changes in bowel habits or urinary habits.  She smokes 1/2 pack of tobacco a day for 10 years.  She is scheduled for mammography and never had a screening colonoscopy.    Past Medical History:   Diagnosis Date    Anxiety     Disease of thyroid gland     GERD (gastroesophageal reflux disease)     Hyperlipidemia     Hypertension     Kidney stone     Seasonal allergies         Past Surgical History:   Procedure Laterality Date    ENDOSCOPY N/A 10/11/2019    Procedure: ESOPHAGOGASTRODUODENOSCOPY with biopsies;  Surgeon: Saba Samuel MD;  Location: [facility];  Service: Gastroenterology    HYSTERECTOMY      LUNG BIOPSY      TONSILLECTOMY          Current Outpatient Medications on File Prior to Visit   Medication Sig Dispense Refill    ezetimibe (ZETIA) 10 MG tablet Take 1 tablet by mouth Daily.      FLUoxetine (PROzac) 20 MG capsule Take 1 capsule by mouth Daily.      IBUPROFEN PO Take  by mouth.      levothyroxine (SYNTHROID,  LEVOTHROID) 137 MCG tablet TAKE 1 TABLET ON AN EMPTY STOMACH EVERY MORNING, AT LEAST 30 MIN BEFORE ANY FOOD OR DRINK/MEDS      lisinopril (PRINIVIL,ZESTRIL) 20 MG tablet Take 1 tablet by mouth Daily.  0    Omega-3 Fatty Acids (fish oil) 1000 MG capsule capsule Take 1 capsule by mouth Daily With Breakfast.      pantoprazole (PROTONIX) 40 MG EC tablet Take 1 tablet by mouth Daily.      Semaglutide,0.25 or 0.5MG/DOS, (Ozempic, 0.25 or 0.5 MG/DOSE,) 2 MG/1.5ML solution pen-injector Inject 2 mg under the skin into the appropriate area as directed 1 (One) Time Per Week.      vitamin B-12 (CYANOCOBALAMIN) 1000 MCG tablet Take 1 tablet by mouth Daily.      vitamin D (ERGOCALCIFEROL) 39960 units capsule capsule Take 1 capsule by mouth 1 (One) Time Per Week.  0    fexofenadine (ALLEGRA) 180 MG tablet Take 1 tablet by mouth Daily. (Patient not taking: Reported on 1/22/2025)      fluticasone (FLONASE) 50 MCG/ACT nasal spray 1 spray into the nostril(s) as directed by provider Daily. (Patient not taking: Reported on 1/22/2025)      levothyroxine (SYNTHROID, LEVOTHROID) 150 MCG tablet TAKE 1 TABLET BY MOUTH IN THE MORNING ON EMPTY STOMACH (Patient not taking: Reported on 1/22/2025)      sertraline (ZOLOFT) 50 MG tablet Take 1 tablet by mouth Daily. (Patient not taking: Reported on 1/22/2025)  0     No current facility-administered medications on file prior to visit.        ALLERGIES:  No Known Allergies     Social History     Socioeconomic History    Marital status: Unknown   Tobacco Use    Smoking status: Every Day     Current packs/day: 0.75     Average packs/day: 0.8 packs/day for 10.0 years (7.5 ttl pk-yrs)     Types: Cigarettes    Smokeless tobacco: Never   Vaping Use    Vaping status: Never Used   Substance and Sexual Activity    Alcohol use: No    Drug use: No    Sexual activity: Defer        Family History   Problem Relation Age of Onset    Cancer Mother     Breast cancer Neg Hx     Colon cancer Neg Hx     Colon polyps Neg  "Hx         Review of Systems   Constitutional:  Positive for fatigue.   HENT: Negative.     Respiratory: Negative.     Cardiovascular: Negative.    Gastrointestinal: Negative.    Endocrine: Negative.    Musculoskeletal: Negative.    Skin: Negative.    Allergic/Immunologic: Negative.    Neurological: Negative.    Hematological: Negative.    Psychiatric/Behavioral: Negative.            Objective     Vitals:    01/22/25 0906   BP: 128/87   Pulse: 88   Resp: 16   Temp: 98.2 °F (36.8 °C)   TempSrc: Oral   SpO2: 98%   Weight: 63.9 kg (140 lb 12.8 oz)   Height: 157.5 cm (62\")   PainSc: 0-No pain         1/22/2025     9:06 AM   Current Status   ECOG score 0       Physical Exam    CONSTITUTIONAL: pleasant well-developed adultwoman  HEENT: no icterus, no thrush, moist membranes  LYMPH: no cervical or supraclavicular lad  CV: RRR, S1S2, no murmur  RESP: cta bilat, no wheezing, no rales  GI: soft, nontender, no splenomegaly, +BS  MUSC: no edema, normal gait  NEURO: alert and oriented x3, normal strength  PSYCH: normal mood anxious affect    RECENT LABS:  Hematology WBC   Date Value Ref Range Status   01/22/2025 16.37 (H) 3.40 - 10.80 10*3/mm3 Final   10/01/2023 9.42 4.5 - 11.0 10*3/uL Final     RBC   Date Value Ref Range Status   01/22/2025 5.21 3.77 - 5.28 10*6/mm3 Final   10/01/2023 4.92 4.0 - 5.2 10*6/uL Final     Hemoglobin   Date Value Ref Range Status   01/22/2025 16.7 (H) 12.0 - 15.9 g/dL Final   10/01/2023 15.1 12.0 - 16.0 g/dL Final     Hematocrit   Date Value Ref Range Status   01/22/2025 48.0 (H) 34.0 - 46.6 % Final   10/01/2023 44.2 36.0 - 46.0 % Final     Platelets   Date Value Ref Range Status   01/22/2025 289 140 - 450 10*3/mm3 Final   10/01/2023 290 140 - 440 10*3/uL Final      No results found for: \"GLUCOSE\", \"BUN\", \"CREATININE\", \"NA\", \"K\", \"CL\", \"CALCIUM\", \"PROTEINTOT\", \"ALBUMIN\", \"ALT\", \"AST\", \"ALKPHOS\", \"BILITOT\", \"GLOB\", \"AGRATIO\", \"BCR\", \"ANIONGAP\", \"EGFR\"      Assessment & Plan     *leukocytosis with " neutrophilia, monocytosis, mild eosinophilia no lymphocytosis or basophilia present  *Mild polycythemia-hemoglobin 16.7  * tobacco use   *50 pound weight loss on Ozempic     Hematology plan/recommendations:  The patient's mild leukocytosis and polycythemia may be related to tobacco use but needs further evaluation.  I have ordered a CMP LDH ESR CRP and erythropoietin level.  To check for myeloproliferative processes I have ordered BCR-ABL FISH JAK2 V6 17F and reflex mutations along with a peripheral blood flow cytometry.  If blood workup negative would consider CT chest abdomen pelvis given her tobacco use history and 50 pound weight loss although this may be secondary to Ozempic and dietary modification.  We will call the patient with results and further advice pending this additional workup.    Thank you for allowing me to participate in the care of this pleasant patient.

## 2025-01-22 NOTE — TELEPHONE ENCOUNTER
No PA required for Flow 50011 [id number] and Fish [id number] per [facility]. Tracking # [id number]. Ok'd lab to send to [facility].    Asked lab to not send Jak2 Mut or Reflexes today. PA for these cannot be submitted till Fish results are back.

## 2025-01-23 ENCOUNTER — PATIENT ROUNDING (BHMG ONLY) (OUTPATIENT)
Dept: ONCOLOGY | Facility: CLINIC | Age: 45
End: 2025-01-23
Payer: COMMERCIAL

## 2025-01-23 LAB
EPO SERPL-ACNC: 3.4 MIU/ML (ref 2.6–18.5)
REF LAB TEST METHOD: NORMAL

## 2025-01-23 NOTE — PROGRESS NOTES
A My-Chart message has been sent to the patient for PATIENT ROUNDING with [facility].

## 2025-01-24 LAB — REF LAB TEST METHOD: NORMAL

## 2025-01-24 NOTE — TELEPHONE ENCOUNTER
PA pending for Jak2 Mut 60102 through [facility]. Pending auth # [id number]. If approved, will go to [facility].

## 2025-01-29 ENCOUNTER — TELEPHONE (OUTPATIENT)
Dept: ONCOLOGY | Facility: CLINIC | Age: 45
End: 2025-01-29
Payer: COMMERCIAL

## 2025-01-29 NOTE — TELEPHONE ENCOUNTER
Left VM with my direct line, [phone]. Need to speak with patient to make lab appointment for Jak2 Mut that was approved by insurance.

## 2025-01-31 ENCOUNTER — HOSPITAL ENCOUNTER (OUTPATIENT)
Dept: MAMMOGRAPHY | Facility: HOSPITAL | Age: 45
Discharge: HOME | End: 2025-01-31
Admitting: NURSE PRACTITIONER
Payer: COMMERCIAL

## 2025-01-31 ENCOUNTER — OFFICE VISIT (OUTPATIENT)
Dept: GASTROENTEROLOGY | Facility: CLINIC | Age: 45
End: 2025-01-31
Payer: COMMERCIAL

## 2025-01-31 VITALS
DIASTOLIC BLOOD PRESSURE: 78 MMHG | WEIGHT: 139 LBS | HEIGHT: 62 IN | SYSTOLIC BLOOD PRESSURE: 102 MMHG | BODY MASS INDEX: 25.58 KG/M2

## 2025-01-31 DIAGNOSIS — K59.03 DRUG-INDUCED CONSTIPATION: ICD-10-CM

## 2025-01-31 DIAGNOSIS — Z12.11 ENCOUNTER FOR SCREENING FOR MALIGNANT NEOPLASM OF COLON: ICD-10-CM

## 2025-01-31 DIAGNOSIS — K22.70 BARRETT'S ESOPHAGUS WITHOUT DYSPLASIA: Primary | ICD-10-CM

## 2025-01-31 DIAGNOSIS — Z12.31 SCREENING MAMMOGRAM, ENCOUNTER FOR: ICD-10-CM

## 2025-01-31 PROCEDURE — 77067 SCR MAMMO BI INCL CAD: CPT

## 2025-01-31 PROCEDURE — 77063 BREAST TOMOSYNTHESIS BI: CPT | Performed by: RADIOLOGY

## 2025-01-31 PROCEDURE — 77063 BREAST TOMOSYNTHESIS BI: CPT

## 2025-01-31 PROCEDURE — 77067 SCR MAMMO BI INCL CAD: CPT | Performed by: RADIOLOGY

## 2025-01-31 RX ORDER — HYDROXYZINE HYDROCHLORIDE 25 MG/1
TABLET, FILM COATED ORAL EVERY 4 HOURS PRN
COMMUNITY

## 2025-01-31 RX ORDER — ASPIRIN 81 MG/1
81 TABLET, CHEWABLE ORAL DAILY
COMMUNITY

## 2025-01-31 RX ORDER — LEVOTHYROXINE SODIUM 125 UG/1
125 TABLET ORAL
COMMUNITY

## 2025-01-31 NOTE — H&P (VIEW-ONLY)
PATIENT INFORMATION  Erica Huynh       - 1980    CHIEF COMPLAINT  Chief Complaint   Patient presents with    Heartburn    Abdominal Pain       HISTORY OF PRESENT ILLNESS  Here today for evaluation    Saw hem/onc for elevated blood counts    40 mg pantoprazole daily, no indigestion, nausea, vomiting, HB/repigastric pain if eating late, relieved with tums. Discomfort with bra a few times a week, more noticeable since starting. Taking ibuprofen daily for blood counts, encouraged to review with Dr. Cross but would recommend against ibuprofen. Pt managed on aspirin. Smoker.    Bowels are ok, has lost 50 lbs on ozempic, skipping 1 day between, and miralax 1-2 times a week. No straining or bleeding.  Reviewed fluids and ok to take miralax daily.    Never has had a colonoscopy and no family hx CRC or polyps.    10/2019 EGD with chemical gastropathy, reflux with barretts, no HP or dysplasia.        REVIEWED PERTINENT RESULTS/ LABS  Lab Results   Component Value Date    CASEREPORT  10/11/2019     Surgical Pathology Report                         Case: [id number]                                  Authorizing Provider:  Saba Samuel MD         Collected:           10/11/2019 09:18 AM          [facility]   Received:            10/11/2019 12:02 PM                                 OR                                                                           Pathologist:           Brandi Hightower MD                                                    Specimens:   1) - Gastric, Body, Gastric Biopsy                                                                  2) - Esophagus, Distal, Distal Esophagus Biopsy                                            FINALDX  10/11/2019     1. Stomach, Biopsy:     A. Benign, fragmented gastric mucosa with scattered changes suggestive of chemical gastropathy.   B. Negative for Helicobacter by routine staining.   C. No metaplasia, dysplasia  nor malignancy identified.    2. Distal Esophagus, Biopsy:   A. Benign gastroesophageal junction mucosa with reflux esophagitis and repair.   B. Rare focus of goblet cell metaplasia by Alcian Blue special stain suggestive of early developing                   HALEY'S ESOPHAGUS.   C. Negative for dysplasia.    Swm/kds       Lab Results   Component Value Date    HGB 16.7 (H) 01/22/2025    MCV 92.1 01/22/2025     01/22/2025    ALT 12 01/22/2025    AST 13 01/22/2025      No results found.    REVIEW OF SYSTEMS  Review of Systems      ACTIVE PROBLEMS  Patient Active Problem List    Diagnosis     Haley's esophagus without dysplasia [K22.70]     Encounter for screening for malignant neoplasm of colon [Z12.11]     Leukocytosis [D72.829]     Polycythemia [D75.1]     Gastroesophageal reflux disease [K21.9]     Epigastric pain [R10.13]          PAST MEDICAL HISTORY  Past Medical History:   Diagnosis Date    Anxiety     Haley esophagus     Disease of thyroid gland     GERD (gastroesophageal reflux disease)     Hyperlipidemia     Hypertension     Kidney stone     Seasonal allergies          SURGICAL HISTORY  Past Surgical History:   Procedure Laterality Date    ENDOSCOPY N/A 10/11/2019    Procedure: ESOPHAGOGASTRODUODENOSCOPY with biopsies;  Surgeon: Saba Samuel MD;  Location: [facility];  Service: Gastroenterology    HYSTERECTOMY      LUNG BIOPSY      TONSILLECTOMY      UPPER GASTROINTESTINAL ENDOSCOPY           FAMILY HISTORY  Family History   Problem Relation Age of Onset    Cancer Mother     Breast cancer Neg Hx     Colon cancer Neg Hx     Colon polyps Neg Hx          SOCIAL HISTORY  Social History     Occupational History    Not on file   Tobacco Use    Smoking status: Every Day     Current packs/day: 0.75     Average packs/day: 0.8 packs/day for 10.0 years (7.5 ttl pk-yrs)     Types: Cigarettes     Passive exposure: Current    Smokeless tobacco: Never   Vaping Use    Vaping status: Never Used   Substance and  "Sexual Activity    Alcohol use: No    Drug use: No    Sexual activity: Yes     Partners: Male     Birth control/protection: Hysterectomy         CURRENT MEDICATIONS    Current Outpatient Medications:     aspirin 81 MG chewable tablet, Chew 1 tablet Daily., Disp: , Rfl:     ezetimibe (ZETIA) 10 MG tablet, Take 1 tablet by mouth Daily., Disp: , Rfl:     hydrOXYzine (ATARAX) 25 MG tablet, Every 4 (Four) Hours As Needed., Disp: , Rfl:     levothyroxine (SYNTHROID, LEVOTHROID) 125 MCG tablet, Take 1 tablet by mouth Every Morning., Disp: , Rfl:     lisinopril (PRINIVIL,ZESTRIL) 20 MG tablet, Take 1 tablet by mouth Daily., Disp: , Rfl: 0    Omega-3 Fatty Acids (fish oil) 1000 MG capsule capsule, Take 1 capsule by mouth Daily With Breakfast., Disp: , Rfl:     pantoprazole (PROTONIX) 40 MG EC tablet, Take 1 tablet by mouth Daily., Disp: , Rfl:     Semaglutide,0.25 or 0.5MG/DOS, (Ozempic, 0.25 or 0.5 MG/DOSE,) 2 MG/1.5ML solution pen-injector, Inject 2 mg under the skin into the appropriate area as directed 1 (One) Time Per Week., Disp: , Rfl:     vitamin B-12 (CYANOCOBALAMIN) 1000 MCG tablet, Take 1 tablet by mouth Daily., Disp: , Rfl:     vitamin D (ERGOCALCIFEROL) 95254 units capsule capsule, Take 1 capsule by mouth 1 (One) Time Per Week., Disp: , Rfl: 0    ALLERGIES  Patient has no known allergies.    VITALS  Vitals:    01/31/25 0901   BP: 102/78   BP Location: Left arm   Patient Position: Sitting   Cuff Size: Adult   Weight: 63 kg (139 lb)   Height: 157.5 cm (62\")       PHYSICAL EXAM  Debilities/Disabilities Identified: None  Emotional Behavior: Appropriate  Wt Readings from Last 3 Encounters:   01/31/25 63 kg (139 lb)   01/22/25 63.9 kg (140 lb 12.8 oz)   05/29/23 83.9 kg (185 lb)     Ht Readings from Last 1 Encounters:   01/31/25 157.5 cm (62\")     Body mass index is 25.42 kg/m².  Physical Exam  Constitutional:       General: She is not in acute distress.     Appearance: Normal appearance. She is not ill-appearing. "   HENT:      Head: Normocephalic and atraumatic.      Mouth/Throat:      Mouth: Mucous membranes are moist.      Pharynx: No posterior oropharyngeal erythema.   Eyes:      General: No scleral icterus.  Cardiovascular:      Rate and Rhythm: Normal rate and regular rhythm.      Heart sounds: Normal heart sounds.   Pulmonary:      Effort: Pulmonary effort is normal.      Breath sounds: Normal breath sounds.   Abdominal:      General: Abdomen is flat. Bowel sounds are normal. There is no distension.      Palpations: Abdomen is soft. There is no mass.      Tenderness: There is no abdominal tenderness. There is no guarding or rebound. Negative signs include Casanova's sign.      Hernia: No hernia is present.   Musculoskeletal:      Cervical back: Neck supple.   Skin:     General: Skin is warm.      Capillary Refill: Capillary refill takes less than 2 seconds.   Neurological:      General: No focal deficit present.      Mental Status: She is alert and oriented to person, place, and time.   Psychiatric:         Mood and Affect: Mood normal.         Behavior: Behavior normal.         Thought Content: Thought content normal.         Judgment: Judgment normal.         CLINICAL DATA REVIEWED   reviewed previous lab results and integrated with today's visit, reviewed notes from other physicians and/or last GI encounter, reviewed previous endoscopy results and available photos, reviewed surgical pathology results from previous biopsies    ASSESSMENT  Diagnoses and all orders for this visit:    Elmore's esophagus without dysplasia  -     Case Request; Standing  -     Case Request    Encounter for screening for malignant neoplasm of colon  -     Case Request; Standing  -     Case Request    Drug-induced constipation    Other orders  -     hydrOXYzine (ATARAX) 25 MG tablet; Every 4 (Four) Hours As Needed.  -     levothyroxine (SYNTHROID, LEVOTHROID) 125 MCG tablet; Take 1 tablet by mouth Every Morning.  -     aspirin 81 MG chewable  tablet; Chew 1 tablet Daily.  -     Follow Anesthesia Guidelines / Protocol; Future          PLAN    Barretts: Continue daily PPI, avoid eating close to bed, PRN AA for breakthrough, reviewed GP diet  Constipation: OK to use miralax daily and reviewed fluid recommendations  EGD and Colon    Return in about 3 months (around 4/30/2025).    I have discussed the above plan with the patient.  They verbalize understanding and are in agreement with the plan.  They have been advised to contact the office for any questions, concerns, or changes related to their health.

## 2025-01-31 NOTE — PROGRESS NOTES
PATIENT INFORMATION  Erica Huynh       - 1980    CHIEF COMPLAINT  Chief Complaint   Patient presents with    Heartburn    Abdominal Pain       HISTORY OF PRESENT ILLNESS  Here today for evaluation    Saw hem/onc for elevated blood counts    40 mg pantoprazole daily, no indigestion, nausea, vomiting, HB/repigastric pain if eating late, relieved with tums. Discomfort with bra a few times a week, more noticeable since starting. Taking ibuprofen daily for blood counts, encouraged to review with Dr. Cross but would recommend against ibuprofen. Pt managed on aspirin. Smoker.    Bowels are ok, has lost 50 lbs on ozempic, skipping 1 day between, and miralax 1-2 times a week. No straining or bleeding.  Reviewed fluids and ok to take miralax daily.    Never has had a colonoscopy and no family hx CRC or polyps.    10/2019 EGD with chemical gastropathy, reflux with barretts, no HP or dysplasia.        REVIEWED PERTINENT RESULTS/ LABS  Lab Results   Component Value Date    CASEREPORT  10/11/2019     Surgical Pathology Report                         Case: [id number]                                  Authorizing Provider:  Saba Samuel MD         Collected:           10/11/2019 09:18 AM          Ordering Location:     [facility]   Received:            10/11/2019 12:02 PM                                 OR                                                                           Pathologist:           Brandi Hightower MD                                                    Specimens:   1) - Gastric, Body, Gastric Biopsy                                                                  2) - Esophagus, Distal, Distal Esophagus Biopsy                                            FINALDX  10/11/2019     1. Stomach, Biopsy:     A. Benign, fragmented gastric mucosa with scattered changes suggestive of chemical gastropathy.   B. Negative for Helicobacter by routine staining.   C. No metaplasia, dysplasia  nor malignancy identified.    2. Distal Esophagus, Biopsy:   A. Benign gastroesophageal junction mucosa with reflux esophagitis and repair.   B. Rare focus of goblet cell metaplasia by Alcian Blue special stain suggestive of early developing                   HALEY'S ESOPHAGUS.   C. Negative for dysplasia.    Swm/kds       Lab Results   Component Value Date    HGB 16.7 (H) 01/22/2025    MCV 92.1 01/22/2025     01/22/2025    ALT 12 01/22/2025    AST 13 01/22/2025      No results found.    REVIEW OF SYSTEMS  Review of Systems      ACTIVE PROBLEMS  Patient Active Problem List    Diagnosis     Haley's esophagus without dysplasia [K22.70]     Encounter for screening for malignant neoplasm of colon [Z12.11]     Leukocytosis [D72.829]     Polycythemia [D75.1]     Gastroesophageal reflux disease [K21.9]     Epigastric pain [R10.13]          PAST MEDICAL HISTORY  Past Medical History:   Diagnosis Date    Anxiety     Haley esophagus     Disease of thyroid gland     GERD (gastroesophageal reflux disease)     Hyperlipidemia     Hypertension     Kidney stone     Seasonal allergies          SURGICAL HISTORY  Past Surgical History:   Procedure Laterality Date    ENDOSCOPY N/A 10/11/2019    Procedure: ESOPHAGOGASTRODUODENOSCOPY with biopsies;  Surgeon: Saba Samuel MD;  Location: [facility];  Service: Gastroenterology    HYSTERECTOMY      LUNG BIOPSY      TONSILLECTOMY      UPPER GASTROINTESTINAL ENDOSCOPY           FAMILY HISTORY  Family History   Problem Relation Age of Onset    Cancer Mother     Breast cancer Neg Hx     Colon cancer Neg Hx     Colon polyps Neg Hx          SOCIAL HISTORY  Social History     Occupational History    Not on file   Tobacco Use    Smoking status: Every Day     Current packs/day: 0.75     Average packs/day: 0.8 packs/day for 10.0 years (7.5 ttl pk-yrs)     Types: Cigarettes     Passive exposure: Current    Smokeless tobacco: Never   Vaping Use    Vaping status: Never Used   Substance and  "Sexual Activity    Alcohol use: No    Drug use: No    Sexual activity: Yes     Partners: Male     Birth control/protection: Hysterectomy         CURRENT MEDICATIONS    Current Outpatient Medications:     aspirin 81 MG chewable tablet, Chew 1 tablet Daily., Disp: , Rfl:     ezetimibe (ZETIA) 10 MG tablet, Take 1 tablet by mouth Daily., Disp: , Rfl:     hydrOXYzine (ATARAX) 25 MG tablet, Every 4 (Four) Hours As Needed., Disp: , Rfl:     levothyroxine (SYNTHROID, LEVOTHROID) 125 MCG tablet, Take 1 tablet by mouth Every Morning., Disp: , Rfl:     lisinopril (PRINIVIL,ZESTRIL) 20 MG tablet, Take 1 tablet by mouth Daily., Disp: , Rfl: 0    Omega-3 Fatty Acids (fish oil) 1000 MG capsule capsule, Take 1 capsule by mouth Daily With Breakfast., Disp: , Rfl:     pantoprazole (PROTONIX) 40 MG EC tablet, Take 1 tablet by mouth Daily., Disp: , Rfl:     Semaglutide,0.25 or 0.5MG/DOS, (Ozempic, 0.25 or 0.5 MG/DOSE,) 2 MG/1.5ML solution pen-injector, Inject 2 mg under the skin into the appropriate area as directed 1 (One) Time Per Week., Disp: , Rfl:     vitamin B-12 (CYANOCOBALAMIN) 1000 MCG tablet, Take 1 tablet by mouth Daily., Disp: , Rfl:     vitamin D (ERGOCALCIFEROL) 24455 units capsule capsule, Take 1 capsule by mouth 1 (One) Time Per Week., Disp: , Rfl: 0    ALLERGIES  Patient has no known allergies.    VITALS  Vitals:    01/31/25 0901   BP: 102/78   BP Location: Left arm   Patient Position: Sitting   Cuff Size: Adult   Weight: 63 kg (139 lb)   Height: 157.5 cm (62\")       PHYSICAL EXAM  Debilities/Disabilities Identified: None  Emotional Behavior: Appropriate  Wt Readings from Last 3 Encounters:   01/31/25 63 kg (139 lb)   01/22/25 63.9 kg (140 lb 12.8 oz)   05/29/23 83.9 kg (185 lb)     Ht Readings from Last 1 Encounters:   01/31/25 157.5 cm (62\")     Body mass index is 25.42 kg/m².  Physical Exam  Constitutional:       General: She is not in acute distress.     Appearance: Normal appearance. She is not ill-appearing. "   HENT:      Head: Normocephalic and atraumatic.      Mouth/Throat:      Mouth: Mucous membranes are moist.      Pharynx: No posterior oropharyngeal erythema.   Eyes:      General: No scleral icterus.  Cardiovascular:      Rate and Rhythm: Normal rate and regular rhythm.      Heart sounds: Normal heart sounds.   Pulmonary:      Effort: Pulmonary effort is normal.      Breath sounds: Normal breath sounds.   Abdominal:      General: Abdomen is flat. Bowel sounds are normal. There is no distension.      Palpations: Abdomen is soft. There is no mass.      Tenderness: There is no abdominal tenderness. There is no guarding or rebound. Negative signs include Casanova's sign.      Hernia: No hernia is present.   Musculoskeletal:      Cervical back: Neck supple.   Skin:     General: Skin is warm.      Capillary Refill: Capillary refill takes less than 2 seconds.   Neurological:      General: No focal deficit present.      Mental Status: She is alert and oriented to person, place, and time.   Psychiatric:         Mood and Affect: Mood normal.         Behavior: Behavior normal.         Thought Content: Thought content normal.         Judgment: Judgment normal.         CLINICAL DATA REVIEWED   reviewed previous lab results and integrated with today's visit, reviewed notes from other physicians and/or last GI encounter, reviewed previous endoscopy results and available photos, reviewed surgical pathology results from previous biopsies    ASSESSMENT  Diagnoses and all orders for this visit:    Elmore's esophagus without dysplasia  -     Case Request; Standing  -     Case Request    Encounter for screening for malignant neoplasm of colon  -     Case Request; Standing  -     Case Request    Drug-induced constipation    Other orders  -     hydrOXYzine (ATARAX) 25 MG tablet; Every 4 (Four) Hours As Needed.  -     levothyroxine (SYNTHROID, LEVOTHROID) 125 MCG tablet; Take 1 tablet by mouth Every Morning.  -     aspirin 81 MG chewable  tablet; Chew 1 tablet Daily.  -     Follow Anesthesia Guidelines / Protocol; Future          PLAN    Barretts: Continue daily PPI, avoid eating close to bed, PRN AA for breakthrough, reviewed GP diet  Constipation: OK to use miralax daily and reviewed fluid recommendations  EGD and Colon    Return in about 3 months (around 4/30/2025).    I have discussed the above plan with the patient.  They verbalize understanding and are in agreement with the plan.  They have been advised to contact the office for any questions, concerns, or changes related to their health.

## 2025-02-04 ENCOUNTER — LAB (OUTPATIENT)
Dept: LAB | Facility: HOSPITAL | Age: 45
End: 2025-02-04
Payer: COMMERCIAL

## 2025-02-11 LAB — REF LAB TEST METHOD: NORMAL

## 2025-02-27 ENCOUNTER — ANESTHESIA EVENT (OUTPATIENT)
Dept: PERIOP | Facility: HOSPITAL | Age: 45
End: 2025-02-27
Payer: COMMERCIAL

## 2025-02-27 LAB — REF LAB TEST METHOD: NORMAL

## 2025-02-28 ENCOUNTER — HOSPITAL ENCOUNTER (OUTPATIENT)
Facility: HOSPITAL | Age: 45
Setting detail: HOSPITAL OUTPATIENT SURGERY
Discharge: HOME | End: 2025-02-28
Attending: INTERNAL MEDICINE | Admitting: INTERNAL MEDICINE
Payer: COMMERCIAL

## 2025-02-28 ENCOUNTER — ANESTHESIA (OUTPATIENT)
Dept: PERIOP | Facility: HOSPITAL | Age: 45
End: 2025-02-28
Payer: COMMERCIAL

## 2025-02-28 VITALS
WEIGHT: 140 LBS | HEART RATE: 91 BPM | TEMPERATURE: 98.2 F | SYSTOLIC BLOOD PRESSURE: 118 MMHG | DIASTOLIC BLOOD PRESSURE: 75 MMHG | RESPIRATION RATE: 12 BRPM | OXYGEN SATURATION: 98 % | BODY MASS INDEX: 25.76 KG/M2 | HEIGHT: 62 IN

## 2025-02-28 DIAGNOSIS — Z12.11 ENCOUNTER FOR SCREENING FOR MALIGNANT NEOPLASM OF COLON: ICD-10-CM

## 2025-02-28 DIAGNOSIS — K22.70 BARRETT'S ESOPHAGUS WITHOUT DYSPLASIA: ICD-10-CM

## 2025-02-28 PROCEDURE — 45380 COLONOSCOPY AND BIOPSY: CPT | Performed by: INTERNAL MEDICINE

## 2025-02-28 PROCEDURE — 25010000002 PROPOFOL 200 MG/20ML EMULSION: Performed by: NURSE ANESTHETIST, CERTIFIED REGISTERED

## 2025-02-28 PROCEDURE — 43239 EGD BIOPSY SINGLE/MULTIPLE: CPT | Performed by: INTERNAL MEDICINE

## 2025-02-28 PROCEDURE — 25010000002 LIDOCAINE 2% SOLUTION: Performed by: NURSE ANESTHETIST, CERTIFIED REGISTERED

## 2025-02-28 PROCEDURE — 88312 SPECIAL STAINS GROUP 1: CPT | Performed by: INTERNAL MEDICINE

## 2025-02-28 PROCEDURE — 88313 SPECIAL STAINS GROUP 2: CPT | Performed by: INTERNAL MEDICINE

## 2025-02-28 PROCEDURE — 45385 COLONOSCOPY W/LESION REMOVAL: CPT | Performed by: INTERNAL MEDICINE

## 2025-02-28 PROCEDURE — 88305 TISSUE EXAM BY PATHOLOGIST: CPT | Performed by: INTERNAL MEDICINE

## 2025-02-28 PROCEDURE — 25810000003 LACTATED RINGERS PER 1000 ML: Performed by: NURSE ANESTHETIST, CERTIFIED REGISTERED

## 2025-02-28 RX ORDER — LIDOCAINE HYDROCHLORIDE 20 MG/ML
INJECTION, SOLUTION INFILTRATION; PERINEURAL AS NEEDED
Status: DISCONTINUED | OUTPATIENT
Start: 2025-02-28 | End: 2025-02-28 | Stop reason: SURG

## 2025-02-28 RX ORDER — SODIUM CHLORIDE, SODIUM LACTATE, POTASSIUM CHLORIDE, CALCIUM CHLORIDE 600; 310; 30; 20 MG/100ML; MG/100ML; MG/100ML; MG/100ML
100 INJECTION, SOLUTION INTRAVENOUS ONCE
Status: DISCONTINUED | OUTPATIENT
Start: 2025-02-28 | End: 2025-02-28 | Stop reason: HOSPADM

## 2025-02-28 RX ORDER — SODIUM CHLORIDE 0.9 % (FLUSH) 0.9 %
10 SYRINGE (ML) INJECTION AS NEEDED
Status: DISCONTINUED | OUTPATIENT
Start: 2025-02-28 | End: 2025-02-28 | Stop reason: HOSPADM

## 2025-02-28 RX ORDER — SODIUM CHLORIDE 0.9 % (FLUSH) 0.9 %
10 SYRINGE (ML) INJECTION EVERY 12 HOURS SCHEDULED
Status: DISCONTINUED | OUTPATIENT
Start: 2025-02-28 | End: 2025-02-28 | Stop reason: HOSPADM

## 2025-02-28 RX ORDER — SODIUM CHLORIDE 9 MG/ML
40 INJECTION, SOLUTION INTRAVENOUS AS NEEDED
Status: DISCONTINUED | OUTPATIENT
Start: 2025-02-28 | End: 2025-02-28 | Stop reason: HOSPADM

## 2025-02-28 RX ORDER — ONDANSETRON 2 MG/ML
4 INJECTION INTRAMUSCULAR; INTRAVENOUS ONCE AS NEEDED
Status: DISCONTINUED | OUTPATIENT
Start: 2025-02-28 | End: 2025-02-28 | Stop reason: HOSPADM

## 2025-02-28 RX ORDER — PROPOFOL 10 MG/ML
INJECTION, EMULSION INTRAVENOUS AS NEEDED
Status: DISCONTINUED | OUTPATIENT
Start: 2025-02-28 | End: 2025-02-28 | Stop reason: SURG

## 2025-02-28 RX ORDER — LIDOCAINE HYDROCHLORIDE 10 MG/ML
0.5 INJECTION, SOLUTION EPIDURAL; INFILTRATION; INTRACAUDAL; PERINEURAL ONCE AS NEEDED
Status: DISCONTINUED | OUTPATIENT
Start: 2025-02-28 | End: 2025-02-28 | Stop reason: HOSPADM

## 2025-02-28 RX ORDER — SODIUM CHLORIDE, SODIUM LACTATE, POTASSIUM CHLORIDE, CALCIUM CHLORIDE 600; 310; 30; 20 MG/100ML; MG/100ML; MG/100ML; MG/100ML
9 INJECTION, SOLUTION INTRAVENOUS CONTINUOUS
Status: DISCONTINUED | OUTPATIENT
Start: 2025-02-28 | End: 2025-02-28 | Stop reason: HOSPADM

## 2025-02-28 RX ADMIN — PROPOFOL 50 MG: 10 INJECTION, EMULSION INTRAVENOUS at 15:45

## 2025-02-28 RX ADMIN — PROPOFOL 50 MG: 10 INJECTION, EMULSION INTRAVENOUS at 15:25

## 2025-02-28 RX ADMIN — PROPOFOL 50 MG: 10 INJECTION, EMULSION INTRAVENOUS at 15:17

## 2025-02-28 RX ADMIN — PROPOFOL 50 MG: 10 INJECTION, EMULSION INTRAVENOUS at 15:16

## 2025-02-28 RX ADMIN — LIDOCAINE HYDROCHLORIDE 100 MG: 20 INJECTION, SOLUTION INFILTRATION; PERINEURAL at 15:16

## 2025-02-28 RX ADMIN — SODIUM CHLORIDE, SODIUM LACTATE, POTASSIUM CHLORIDE, AND CALCIUM CHLORIDE 9 ML/HR: 600; 310; 30; 20 INJECTION, SOLUTION INTRAVENOUS at 12:45

## 2025-02-28 RX ADMIN — PROPOFOL 50 MG: 10 INJECTION, EMULSION INTRAVENOUS at 15:22

## 2025-02-28 RX ADMIN — PROPOFOL 50 MG: 10 INJECTION, EMULSION INTRAVENOUS at 15:49

## 2025-02-28 RX ADMIN — PROPOFOL 50 MG: 10 INJECTION, EMULSION INTRAVENOUS at 15:37

## 2025-02-28 RX ADMIN — PROPOFOL 50 MG: 10 INJECTION, EMULSION INTRAVENOUS at 15:41

## 2025-02-28 RX ADMIN — PROPOFOL 20 MG: 10 INJECTION, EMULSION INTRAVENOUS at 15:57

## 2025-02-28 RX ADMIN — PROPOFOL 50 MG: 10 INJECTION, EMULSION INTRAVENOUS at 15:20

## 2025-02-28 RX ADMIN — PROPOFOL 50 MG: 10 INJECTION, EMULSION INTRAVENOUS at 15:53

## 2025-02-28 RX ADMIN — PROPOFOL 50 MG: 10 INJECTION, EMULSION INTRAVENOUS at 15:33

## 2025-02-28 RX ADMIN — PROPOFOL 50 MG: 10 INJECTION, EMULSION INTRAVENOUS at 15:29

## 2025-02-28 NOTE — ANESTHESIA POSTPROCEDURE EVALUATION
Patient: Erica Huynh    Procedure Summary       Date: 02/28/25 Room / Location: [facility] ENDOSCOPY 1 /  [facility]    Anesthesia Start: 1508 Anesthesia Stop: 1602    Procedures:       ESOPHAGOGASTRODUODENOSCOPY WITH BIOPSY (Esophagus)      COLONOSCOPY WITH POLYPECTOMY Diagnosis:       Elmore's esophagus without dysplasia      Encounter for screening for malignant neoplasm of colon      Colon polyps      (Elmore's esophagus without dysplasia [K22.70])      (Encounter for screening for malignant neoplasm of colon [Z12.11])    Surgeons: Blane Wing MD Provider: Vijaya Salinas CRNA    Anesthesia Type: MAC ASA Status: 2            Anesthesia Type: MAC    Vitals  Vitals Value Taken Time   /75 02/28/25 1635   Temp     Pulse 91 02/28/25 1636   Resp 12 02/28/25 1620   SpO2 97 % 02/28/25 1636   Vitals shown include unfiled device data.        Post Anesthesia Care and Evaluation    Patient location during evaluation: bedside  Patient participation: complete - patient participated  Level of consciousness: awake and alert  Pain score: 0  Pain management: adequate    Airway patency: patent  Anesthetic complications: No anesthetic complications  PONV Status: none  Cardiovascular status: acceptable  Respiratory status: acceptable  Hydration status: acceptable

## 2025-02-28 NOTE — INTERVAL H&P NOTE
"Vital Signs  /90 (BP Location: Left arm, Patient Position: Lying)   Pulse 110   Temp 98.2 °F (36.8 °C) (Oral)   Resp 10   Ht 157.5 cm (62\")   Wt 63.5 kg (140 lb)   SpO2 100%   BMI 25.61 kg/m²     H&P reviewed. The patient was examined and there are no changes to the H&P.        "

## 2025-02-28 NOTE — BRIEF OP NOTE
ESOPHAGOGASTRODUODENOSCOPY WITH BIOPSY, COLONOSCOPY WITH POLYPECTOMY  Progress Note    Erica Huynh  2/28/2025    Pre-op Diagnosis:   Elmore's esophagus without dysplasia [K22.70]  Encounter for screening for malignant neoplasm of colon [Z12.11]       Post-Op Diagnosis Codes:     * Elmore's esophagus without dysplasia [K22.70]     * Encounter for screening for malignant neoplasm of colon [Z12.11]     * Colon polyps [K63.5]    Procedure(s):      Procedure(s):  ESOPHAGOGASTRODUODENOSCOPY WITH BIOPSY  COLONOSCOPY WITH POLYPECTOMY              Surgeon(s):  Blane Wing MD    Anesthesia: Monitored Anesthesia Care    Staff:   Circulator: Madeline Han, WAI; Alicia Esteban RN  Scrub Person: Elian France       Estimated Blood Loss: none    Urine Voided: * No values recorded between 2/28/2025  3:07 PM and 2/28/2025  4:03 PM *    Specimens:                Specimens       ID Source Type Tests Collected By Collected At Frozen?    A Esophagus, Distal Tissue TISSUE PATHOLOGY EXAM   Blane Wing MD 2/28/25 1525     This specimen was not marked as sent.    B Large Intestine, Right / Ascending Colon Polyp TISSUE PATHOLOGY EXAM   Blane Wing MD 2/28/25 1541     Description: ascending colon polyps x 2    This specimen was not marked as sent.    C Large Intestine, Left / Descending Colon Polyp TISSUE PATHOLOGY EXAM   Blane Wing MD 2/28/25 1547     Description: descending colon polyps x 2    This specimen was not marked as sent.    D Large Intestine, Sigmoid Colon Polyp TISSUE PATHOLOGY EXAM   Blane Wing MD 2/28/25 1551     Description: sigmoid colon polyp    This specimen was not marked as sent.              Drains: * No LDAs found *    Findings: Normal Duodenum  Normal Stomach  SSBE-Biopsy    Colon to TI Good Prep  [id number]      Complications: None          Blane Wing MD     Date: 2/28/2025  Time: 16:04 EST

## 2025-02-28 NOTE — ANESTHESIA PREPROCEDURE EVALUATION
Anesthesia Evaluation     Patient summary reviewed and Nursing notes reviewed   no history of anesthetic complications:   NPO Solid Status: > 8 hours  NPO Liquid Status: > 4 hours           Airway   Mallampati: II  TM distance: >3 FB  Neck ROM: full  No difficulty expected  Dental - normal exam     Pulmonary - normal exam    breath sounds clear to auscultation  (+) a smoker Current,  Cardiovascular - normal exam  Exercise tolerance: good (4-7 METS)    Rhythm: regular  Rate: normal    (+) hypertension, hyperlipidemia      Neuro/Psych  (+) psychiatric history Anxiety  GI/Hepatic/Renal/Endo    (+) GERD, renal disease- stones, thyroid problem hypothyroidism    Musculoskeletal (-) negative ROS    Abdominal  - normal exam   Substance History - negative use     OB/GYN negative ob/gyn ROS         Other - negative ROS                   Anesthesia Plan    ASA 2     MAC     intravenous induction     Anesthetic plan, risks, benefits, and alternatives have been provided, discussed and informed consent has been obtained with: patient.  Pre-procedure education provided  Use of blood products discussed with patient  Consented to blood products.    Plan discussed with CRNA.    CODE STATUS:

## 2025-03-05 ENCOUNTER — TELEPHONE (OUTPATIENT)
Dept: ONCOLOGY | Facility: CLINIC | Age: 45
End: 2025-03-05
Payer: COMMERCIAL

## 2025-03-05 DIAGNOSIS — R63.4 WEIGHT LOSS: Primary | ICD-10-CM

## 2025-03-05 DIAGNOSIS — R79.82 ELEVATED C-REACTIVE PROTEIN (CRP): ICD-10-CM

## 2025-03-05 DIAGNOSIS — D72.829 LEUKOCYTOSIS, UNSPECIFIED TYPE: ICD-10-CM

## 2025-03-05 DIAGNOSIS — D75.1 POLYCYTHEMIA: ICD-10-CM

## 2025-03-05 NOTE — TELEPHONE ENCOUNTER
----- Message from Shamar Cross sent at 3/5/2025  6:56 AM EST -----  PIP workup for abnormal blood counts returned neg so far except mildly elevated inflammatory marker CRP.  Can offer ct cap with iv/po contrast to further evaluate given her weight loss.  She should also have a colonoscopy for screening.  Can see after CTs if agreeable with repeat cbc

## 2025-03-05 NOTE — TELEPHONE ENCOUNTER
Patient verbalized understanding of Dr. Cross's message. She would like to reiterate to Dr. Cross that her markers were elevated even before the weight loss. She had a colonoscopy done on Friday. She is agreeable to CT imaging and follow up with Dr. Cross.

## 2025-03-19 NOTE — PROGRESS NOTES
Subjective     REASON FOR CONSULTATION:  leukocytosis, polycythemia  Provide an opinion on any further workup or treatment                             REQUESTING PHYSICIAN:  Davin    RECORDS OBTAINED:  Records of the patients history including those obtained from the referring provider were reviewed and summarized in detail.    HISTORY OF PRESENT ILLNESS:  The patient is a 44 y.o. year old female who is here for an opinion about the above issue.    History of Present Illness   This is a pleasant 44-year-old woman referred from her primary care provider for evaluation of leukocytosis and polycythemia.  The patient's white cell count has intermittently been high for several years but polycythemia is a more recent finding.  She has been on Ozempic since April 2024 and lost 50 pounds of weight with combination of Ozempic and diet.  She otherwise feels well with no fevers chills night sweats lymphadenopathy dysuria cough shortness of breath or other constitutional type symptoms.  She denies changes in bowel habits or urinary habits.  She smokes 1/2 pack of tobacco a day for 10 years.  She is scheduled for mammography and never had a screening colonoscopy.    The patient return today for follow-up and repeat lab review.  She is doing well with no acute complaints.  She does relate history of fatigue, snoring and daytime somnolence although not to the point of falling asleep while at work driving etc.    Past Medical History:   Diagnosis Date    Anxiety     Elmore esophagus     Disease of thyroid gland     GERD (gastroesophageal reflux disease)     Hyperlipidemia     Hypertension     Kidney stone     Seasonal allergies         Past Surgical History:   Procedure Laterality Date    COLONOSCOPY W/ POLYPECTOMY N/A 2/28/2025    Procedure: COLONOSCOPY WITH POLYPECTOMY;  Surgeon: Blane Wing MD;  Location: [facility];  Service: Gastroenterology;  Laterality: N/A;  ascending colon polyps x2 (cold snare x2),  descending colon polyps x2 (cold snare x1), sigmoid colon polyp    ENDOSCOPY N/A 10/11/2019    Procedure: ESOPHAGOGASTRODUODENOSCOPY with biopsies;  Surgeon: Saba Samuel MD;  Location: [facility] OR;  Service: Gastroenterology    ENDOSCOPY N/A 2/28/2025    Procedure: ESOPHAGOGASTRODUODENOSCOPY WITH BIOPSY;  Surgeon: Blane Wing MD;  Location: [facility] OR;  Service: Gastroenterology;  Laterality: N/A;  Barretts esophagus- biopsy    HYSTERECTOMY  2015    LUNG BIOPSY      TONSILLECTOMY      UPPER GASTROINTESTINAL ENDOSCOPY          Current Outpatient Medications on File Prior to Visit   Medication Sig Dispense Refill    aspirin 81 MG chewable tablet Chew 1 tablet Daily.      ezetimibe (ZETIA) 10 MG tablet Take 1 tablet by mouth Daily.      hydrOXYzine (ATARAX) 25 MG tablet Every 4 (Four) Hours As Needed.      levothyroxine (SYNTHROID, LEVOTHROID) 125 MCG tablet Take 1 tablet by mouth Every Morning.      lisinopril (PRINIVIL,ZESTRIL) 20 MG tablet Take 1 tablet by mouth Daily.  0    Omega-3 Fatty Acids (fish oil) 1000 MG capsule capsule Take 1 capsule by mouth Daily With Breakfast.      pantoprazole (PROTONIX) 40 MG EC tablet Take 1 tablet by mouth Daily.      Semaglutide,0.25 or 0.5MG/DOS, (Ozempic, 0.25 or 0.5 MG/DOSE,) 2 MG/1.5ML solution pen-injector Inject 2 mg under the skin into the appropriate area as directed 1 (One) Time Per Week.      vitamin B-12 (CYANOCOBALAMIN) 1000 MCG tablet Take 1 tablet by mouth Daily.      vitamin D (ERGOCALCIFEROL) 38639 units capsule capsule Take 1 capsule by mouth 1 (One) Time Per Week.  0     No current facility-administered medications on file prior to visit.        ALLERGIES:  No Known Allergies     Social History     Socioeconomic History    Marital status: Unknown    Number of children: 2   Tobacco Use    Smoking status: Every Day     Current packs/day: 0.75     Average packs/day: 0.8 packs/day for 10.0 years (7.5 ttl pk-yrs)     Types: Cigarettes     Passive  "exposure: Current    Smokeless tobacco: Never   Vaping Use    Vaping status: Never Used   Substance and Sexual Activity    Alcohol use: No    Drug use: No    Sexual activity: Yes     Partners: Male     Birth control/protection: Hysterectomy        Family History   Problem Relation Age of Onset    Cancer Mother     Uterine cancer Mother     Hypertension Father     Breast cancer Neg Hx     Colon cancer Neg Hx     Colon polyps Neg Hx     Malig Hyperthermia Neg Hx         Review of Systems   Constitutional:  Positive for fatigue.   HENT: Negative.     Respiratory: Negative.     Cardiovascular: Negative.    Gastrointestinal: Negative.    Endocrine: Negative.    Musculoskeletal: Negative.    Skin: Negative.    Allergic/Immunologic: Negative.    Neurological: Negative.    Hematological: Negative.    Psychiatric/Behavioral: Negative.     ROS is unchanged-3/26/2025      Objective     Vitals:    03/26/25 0932   BP: 111/75   Pulse: 89   Resp: 16   Temp: 98.4 °F (36.9 °C)   TempSrc: Infrared   SpO2: 97%   Weight: 64 kg (141 lb 3.2 oz)   Height: 157.5 cm (62.01\")   PainSc: 0-No pain           3/26/2025     9:32 AM   Current Status   ECOG score 0       Physical Exam    CONSTITUTIONAL: pleasant well-developed adultwoman  HEENT: no icterus, no thrush, moist membranes  LYMPH: no cervical or supraclavicular lad  CV: RRR, S1S2, no murmur  RESP: cta bilat, no wheezing, no rales  GI: soft, nontender, no splenomegaly, +BS  MUSC: no edema, normal gait  NEURO: alert and oriented x3, normal strength  PSYCH: normal mood and affect    RECENT LABS:  Hematology WBC   Date Value Ref Range Status   03/26/2025 14.68 (H) 3.40 - 10.80 10*3/mm3 Final   10/01/2023 9.42 4.5 - 11.0 10*3/uL Final     RBC   Date Value Ref Range Status   03/26/2025 5.23 3.77 - 5.28 10*6/mm3 Final   10/01/2023 4.92 4.0 - 5.2 10*6/uL Final     Hemoglobin   Date Value Ref Range Status   03/26/2025 16.7 (H) 12.0 - 15.9 g/dL Final   10/01/2023 15.1 12.0 - 16.0 g/dL Final "     Hematocrit   Date Value Ref Range Status   03/26/2025 47.7 (H) 34.0 - 46.6 % Final   10/01/2023 44.2 36.0 - 46.0 % Final     Platelets   Date Value Ref Range Status   03/26/2025 287 140 - 450 10*3/mm3 Final   10/01/2023 290 140 - 440 10*3/uL Final        Lab Results   Component Value Date    GLUCOSE 89 01/22/2025    BUN 21 (H) 01/22/2025    CREATININE 0.79 01/22/2025     (L) 01/22/2025    K 4.8 01/22/2025     01/22/2025    CALCIUM 9.8 01/22/2025    PROTEINTOT 7.0 01/22/2025    ALBUMIN 4.4 01/22/2025    ALT 12 01/22/2025    AST 13 01/22/2025    ALKPHOS 63 01/22/2025    BILITOT 0.5 01/22/2025    GLOB 2.6 01/22/2025    AGRATIO 1.7 01/22/2025    BCR 26.6 (H) 01/22/2025    ANIONGAP 10.1 01/22/2025    EGFR 94.1 01/22/2025     CT Chest With Contrast Diagnostic (03/21/2025 15:34)   CT Abdomen Pelvis With Contrast (03/21/2025 15:34)   Impression:  1. 5 mm noncalcified left lower lobe pulmonary nodule. Repeat CT scan of the chest would be recommended in 12 months.  2. No acute process seen within the abdomen or pelvis.    Assessment & Plan     *leukocytosis with neutrophilia, monocytosis, mild eosinophilia no lymphocytosis or basophilia present  Workup has included negative PCR-ABL FISH, negative peripheral blood flow cytometry for abnormal monoclonal B or T-cell populations, negative JAK2 V617F, exon 12-15, MPL and CALR mutations, mildly elevated CRP 0.56  *Mild polycythemia-hemoglobin 16.7  Erythropoietin level 3.4 but negative but molecular workup (JAK2 V6 17F, exon 12-15, MPL and CALR mutations)  * tobacco use   CT chest 3/5/2025-left lower lobe pulmonary nodule 5 mm  *50 pound weight loss on Ozempic   CT chest abdomen pelvis 3/5/2025-noncalcified left lower lobe pulmonary nodule 5 mm, no acute process abdomen pelvis    Hematology plan/recommendations:  Patient's molecular studies are negative she has mild polycythemia with a low EPO level and associated leukocytosis so unclear if she could have a  myeloproliferative neoplasm.  I recommended a sleep study first to evaluate undiagnosed obstructive sleep apnea.  I will recheck her CBC in 6 months.  If she has worsening polycythemia or worsening leukocytosis or development of thrombocytosis a bone marrow biopsy would be considered.  I advised the patient to stop smoking.  I ordered a follow-up CT of the chest in 6 months to monitor the left lower lobe pulmonary nodule.

## 2025-03-21 ENCOUNTER — HOSPITAL ENCOUNTER (OUTPATIENT)
Dept: CT IMAGING | Facility: HOSPITAL | Age: 45
Discharge: HOME | End: 2025-03-21
Admitting: INTERNAL MEDICINE
Payer: COMMERCIAL

## 2025-03-21 DIAGNOSIS — D75.1 POLYCYTHEMIA: ICD-10-CM

## 2025-03-21 DIAGNOSIS — R63.4 WEIGHT LOSS: ICD-10-CM

## 2025-03-21 DIAGNOSIS — R79.82 ELEVATED C-REACTIVE PROTEIN (CRP): ICD-10-CM

## 2025-03-21 DIAGNOSIS — D72.829 LEUKOCYTOSIS, UNSPECIFIED TYPE: ICD-10-CM

## 2025-03-21 PROCEDURE — 25510000002 DIATRIZOATE MEGLUMINE & SODIUM PER 1 ML: Performed by: INTERNAL MEDICINE

## 2025-03-21 PROCEDURE — 74177 CT ABD & PELVIS W/CONTRAST: CPT

## 2025-03-21 PROCEDURE — 71260 CT THORAX DX C+: CPT

## 2025-03-21 PROCEDURE — 25510000001 IOPAMIDOL PER 1 ML: Performed by: INTERNAL MEDICINE

## 2025-03-21 RX ORDER — DIATRIZOATE MEGLUMINE AND DIATRIZOATE SODIUM 660; 100 MG/ML; MG/ML
30 SOLUTION ORAL; RECTAL ONCE
Status: COMPLETED | OUTPATIENT
Start: 2025-03-21 | End: 2025-03-21

## 2025-03-21 RX ORDER — IOPAMIDOL 755 MG/ML
100 INJECTION, SOLUTION INTRAVASCULAR
Status: COMPLETED | OUTPATIENT
Start: 2025-03-21 | End: 2025-03-21

## 2025-03-21 RX ADMIN — DIATRIZOATE MEGLUMINE AND DIATRIZOATE SODIUM 30 ML: 660; 100 LIQUID ORAL; RECTAL at 14:03

## 2025-03-21 RX ADMIN — IOPAMIDOL 100 ML: 755 INJECTION, SOLUTION INTRAVENOUS at 15:33

## 2025-03-26 ENCOUNTER — LAB (OUTPATIENT)
Dept: LAB | Facility: HOSPITAL | Age: 45
End: 2025-03-26
Payer: COMMERCIAL

## 2025-03-26 ENCOUNTER — OFFICE VISIT (OUTPATIENT)
Dept: ONCOLOGY | Facility: CLINIC | Age: 45
End: 2025-03-26
Payer: COMMERCIAL

## 2025-03-26 VITALS
HEIGHT: 62 IN | OXYGEN SATURATION: 97 % | BODY MASS INDEX: 25.98 KG/M2 | HEART RATE: 89 BPM | WEIGHT: 141.2 LBS | DIASTOLIC BLOOD PRESSURE: 75 MMHG | TEMPERATURE: 98.4 F | RESPIRATION RATE: 16 BRPM | SYSTOLIC BLOOD PRESSURE: 111 MMHG

## 2025-03-26 DIAGNOSIS — D75.1 POLYCYTHEMIA: Primary | ICD-10-CM

## 2025-03-26 DIAGNOSIS — R91.1 LUNG NODULE: ICD-10-CM

## 2025-03-26 DIAGNOSIS — D75.1 POLYCYTHEMIA: ICD-10-CM

## 2025-03-26 DIAGNOSIS — D72.829 LEUKOCYTOSIS, UNSPECIFIED TYPE: ICD-10-CM

## 2025-03-26 LAB
BASOPHILS # BLD AUTO: 0.2 10*3/MM3 (ref 0–0.2)
BASOPHILS NFR BLD AUTO: 1.4 % (ref 0–1.5)
DEPRECATED RDW RBC AUTO: 43.1 FL (ref 37–54)
EOSINOPHIL # BLD AUTO: 0.53 10*3/MM3 (ref 0–0.4)
EOSINOPHIL NFR BLD AUTO: 3.6 % (ref 0.3–6.2)
ERYTHROCYTE [DISTWIDTH] IN BLOOD BY AUTOMATED COUNT: 12.8 % (ref 12.3–15.4)
HCT VFR BLD AUTO: 47.7 % (ref 34–46.6)
HGB BLD-MCNC: 16.7 G/DL (ref 12–15.9)
IMM GRANULOCYTES # BLD AUTO: 0.07 10*3/MM3 (ref 0–0.05)
IMM GRANULOCYTES NFR BLD AUTO: 0.5 % (ref 0–0.5)
LYMPHOCYTES # BLD AUTO: 3.14 10*3/MM3 (ref 0.7–3.1)
LYMPHOCYTES NFR BLD AUTO: 21.4 % (ref 19.6–45.3)
MCH RBC QN AUTO: 31.9 PG (ref 26.6–33)
MCHC RBC AUTO-ENTMCNC: 35 G/DL (ref 31.5–35.7)
MCV RBC AUTO: 91.2 FL (ref 79–97)
MONOCYTES # BLD AUTO: 0.93 10*3/MM3 (ref 0.1–0.9)
MONOCYTES NFR BLD AUTO: 6.3 % (ref 5–12)
NEUTROPHILS NFR BLD AUTO: 66.8 % (ref 42.7–76)
NEUTROPHILS NFR BLD AUTO: 9.81 10*3/MM3 (ref 1.7–7)
NRBC BLD AUTO-RTO: 0 /100 WBC (ref 0–0.2)
PLATELET # BLD AUTO: 287 10*3/MM3 (ref 140–450)
PMV BLD AUTO: 9.9 FL (ref 6–12)
RBC # BLD AUTO: 5.23 10*6/MM3 (ref 3.77–5.28)
WBC NRBC COR # BLD AUTO: 14.68 10*3/MM3 (ref 3.4–10.8)

## 2025-03-26 PROCEDURE — 99214 OFFICE O/P EST MOD 30 MIN: CPT | Performed by: INTERNAL MEDICINE

## 2025-03-26 PROCEDURE — 85025 COMPLETE CBC W/AUTO DIFF WBC: CPT | Performed by: INTERNAL MEDICINE

## (undated) DEVICE — SNAR POLYP CAPTIVATOR/COLD STFF RND 10MM 240CM

## (undated) DEVICE — JACKT LAB F/R KNIT CUFF/COLR XLG BLU

## (undated) DEVICE — FRCP BX RADJAW4 NDL 2.8 240CM LG OG BX40

## (undated) DEVICE — MASK,FACE,FLUID RES,SHLD,FOGFREE,TIES: Brand: MEDLINE

## (undated) DEVICE — LINER SURG CANSTR SXN S/RIGD 1500CC

## (undated) DEVICE — JACKT LAB KNIT COLR LG BLU

## (undated) DEVICE — Device

## (undated) DEVICE — THE SINGLE USE ETRAP – POLYP TRAP IS USED FOR SUCTION RETRIEVAL OF ENDOSCOPICALLY REMOVED POLYPS.: Brand: ETRAP

## (undated) DEVICE — GLV SURG SENSICARE MICRO PF LF 6 STRL

## (undated) DEVICE — THE BITE BLOCK MAXI, LATEX FREE STRAP IS USED TO PROTECT THE ENDOSCOPE INSERTION TUBE FROM BEING BITTEN BY THE PATIENT.

## (undated) DEVICE — BW-412T DISP COMBO CLEANING BRUSH: Brand: SINGLE USE COMBINATION CLEANING BRUSH

## (undated) DEVICE — SPNG GZ WOVN 4X4IN 12PLY 10/BX STRL

## (undated) DEVICE — VIAL FORMALIN CAP 10P 40ML

## (undated) DEVICE — GLV SURG SENSICARE PI MIC PF SZ8 LF STRL

## (undated) DEVICE — GOWN ISOL W/THUMB UNIV BLU BX/15

## (undated) DEVICE — SYR LL 3CC

## (undated) DEVICE — Device: Brand: DEFENDO AIR/WATER/SUCTION AND BIOPSY VALVE

## (undated) DEVICE — KT ORCA ORCAPOD DISP STRL

## (undated) DEVICE — SUCTION CANISTER, 3000CC,SAFELINER: Brand: DEROYAL

## (undated) DEVICE — ADAPT CLN BIOGUARD AIR/H2O DISP